# Patient Record
Sex: FEMALE | Race: WHITE | Employment: FULL TIME | URBAN - METROPOLITAN AREA
[De-identification: names, ages, dates, MRNs, and addresses within clinical notes are randomized per-mention and may not be internally consistent; named-entity substitution may affect disease eponyms.]

---

## 2017-06-03 ENCOUNTER — OFFICE VISIT (OUTPATIENT)
Dept: FAMILY MEDICINE CLINIC | Age: 47
End: 2017-06-03

## 2017-06-03 VITALS
HEIGHT: 64 IN | TEMPERATURE: 98.6 F | BODY MASS INDEX: 42.37 KG/M2 | WEIGHT: 248.2 LBS | DIASTOLIC BLOOD PRESSURE: 74 MMHG | SYSTOLIC BLOOD PRESSURE: 108 MMHG | OXYGEN SATURATION: 97 % | HEART RATE: 93 BPM | RESPIRATION RATE: 16 BRPM

## 2017-06-03 DIAGNOSIS — J40 BRONCHITIS: Primary | ICD-10-CM

## 2017-06-03 DIAGNOSIS — R05.9 COUGH: ICD-10-CM

## 2017-06-03 RX ORDER — AZITHROMYCIN 250 MG/1
TABLET, FILM COATED ORAL
Qty: 6 TAB | Refills: 0 | Status: SHIPPED | OUTPATIENT
Start: 2017-06-03 | End: 2017-06-08

## 2017-06-03 RX ORDER — PROMETHAZINE HYDROCHLORIDE AND CODEINE PHOSPHATE 6.25; 1 MG/5ML; MG/5ML
5 SOLUTION ORAL
Qty: 80 ML | Refills: 0 | Status: SHIPPED | OUTPATIENT
Start: 2017-06-03 | End: 2017-11-03

## 2017-06-03 RX ORDER — FLUTICASONE PROPIONATE 50 MCG
2 SPRAY, SUSPENSION (ML) NASAL DAILY
Qty: 1 BOTTLE | Refills: 0 | Status: SHIPPED | OUTPATIENT
Start: 2017-06-03

## 2017-06-03 NOTE — PROGRESS NOTES
Chief Complaint   Patient presents with    Cough     x 3 weeks non productive with some clear mucus    Shortness of Breath     c/o feeling pressure in chest that comes and goes     Denies taking any otc medication  Med list reviewed  \"REVIEWED RECORD IN PREPARATION FOR VISIT AND HAVE OBTAINED THE NECESSARY DOCUMENTATION\"

## 2017-06-03 NOTE — MR AVS SNAPSHOT
Visit Information Date & Time Provider Department Dept. Phone Encounter #  
 6/3/2017  9:40 AM Jeff Corcoran MD [address] [place] [address] [phone] [id number] Follow-up Instructions Return if symptoms worsen or fail to improve. Your Appointments 7/3/2017 11:00 AM  
COMPLETE PHYSICAL with Daysi Saeed MD  
[facility]) Appt Note: o/b mychart - CPE  
 [address] [id number]  
[phone]  
  
   
 [address] [address] Upcoming Health Maintenance Date Due Pneumococcal 19-64 Medium Risk (1 of 1 - PPSV23) 4/19/1991 INFLUENZA AGE 9 TO ADULT 8/1/2017 BREAST CANCER SCRN MAMMOGRAM 10/10/2018 PAP AKA CERVICAL CYTOLOGY 11/10/2018 DTaP/Tdap/Td series (2 - Td) 11/10/2025 Allergies as of 6/3/2017  Review Complete On: 11/10/2015 By: [address] [place] [address] [address], MD  
 No Known Allergies Current Immunizations  Reviewed on 11/10/2015 Name Date Tdap 11/10/2015 Not reviewed this visit You Were Diagnosed With   
  
 Codes Comments Bronchitis    -  Primary ICD-10-CM: M64 ICD-9-CM: 248 Cough     ICD-10-CM: R05 ICD-9-CM: 521. 2 Vitals BP Pulse Temp Resp Height(growth percentile) Weight(growth percentile) 108/74 (BP 1 Location: Left arm, BP Patient Position: Sitting) 93 98.6 °F (37 °C) (Oral) 16 5' 4\" (1.626 m) 248 lb 3.2 oz (112.6 kg) LMP SpO2 BMI OB Status Smoking Status 04/19/2017 97% 42.6 kg/m2 Having regular periods Never Smoker Vitals History BMI and BSA Data Body Mass Index Body Surface Area  
 42.6 kg/m 2 2.25 m 2 Preferred Pharmacy Pharmacy Name Phone [facility] [phone] Your Updated Medication List  
  
   
This list is accurate as of: 6/3/17 10:52 AM.  Always use your most recent med list.  
  
  
  
  
 azithromycin 250 mg tablet Commonly known as:  Samantha Main Take 2 tablets today, then take 1 tablet daily  
  
 budesonide-formoterol 80-4.5 mcg/actuation Hfaa inhaler Commonly known as:  SYMBICORT Take 2 Puffs by inhalation two (2) times a day. cetirizine 10 mg tablet Commonly known as:  ZYRTEC Take 1 Tab by mouth daily. fluticasone 50 mcg/actuation nasal spray Commonly known as:  Beardsley Mad 2 Sprays by Both Nostrils route daily. KRILL OIL PO Take  by mouth.  
  
 montelukast 10 mg tablet Commonly known as:  SINGULAIR Take 1 Tab by mouth every evening.  
  
 multivitamin with iron-mineral 0.8 mg Cmpk Take  by mouth. Multivitamin-Mineral Oil pack, Lemon Peel & Wild [employer] concentrate oil  
  
 promethazine-codeine 6.25-10 mg/5 mL syrup Commonly known as:  PHENERGAN with CODEINE Take 5 mL by mouth four (4) times daily as needed for Cough. Max Daily Amount: 20 mL. VITAMIN D3 PO Take  by mouth. Prescriptions Printed Refills  
 promethazine-codeine (PHENERGAN WITH CODEINE) 6.25-10 mg/5 mL syrup 0 Sig: Take 5 mL by mouth four (4) times daily as needed for Cough. Max Daily Amount: 20 mL. Class: Print Route: Oral  
  
Prescriptions Sent to Pharmacy Refills  
 azithromycin (ZITHROMAX) 250 mg tablet 0 Sig: Take 2 tablets today, then take 1 tablet daily Class: Normal  
 Pharmacy: [address], [address] [place] [address] [facility] [place]. Ph #: [phone]  
 fluticasone (FLONASE) 50 mcg/actuation nasal spray 0 Si Sprays by Both Nostrils route daily. Class: Normal  
 Pharmacy: [address], [address] [place] [address] [facility] [place]. Ph #: [phone] Route: Both Nostrils Follow-up Instructions Return if symptoms worsen or fail to improve. Introducing [facility]!    
 Dear Vinay Sharma: 
 Thank you for requesting a [email or web address] account. Our records indicate that you already have an active [email or web address] account. You can access your account anytime at [email or web address] Did you know that you can access your hospital and ER discharge instructions at any time in [email or web address]? You can also review all of your test results from your hospital stay or ER visit. Additional Information If you have questions, please visit the Frequently Asked Questions section of the [email or web address] website at [email or web address] Remember, [email or web address] is NOT to be used for urgent needs. For medical emergencies, dial 911. Now available from your iPhone and Android! Please provide this summary of care documentation to your next provider. Your primary care clinician is listed as SYLVIA DOS SANTOS. If you have any questions after today's visit, please call [phone].

## 2017-06-03 NOTE — PROGRESS NOTES
Subjective:     Chief Complaint   Patient presents with    Cough     x 3 weeks non productive with some clear mucus    Shortness of Breath     c/o feeling pressure in chest that comes and goes        She  is a 41y.o. year old female  who presents with a complain of non productive coughing spells for the past 3 weeks. Reports that the symptoms started with cold symptoms followed by this persistent non productive cough. She does not feel any sob but her chest hurts when she cough. Denies any fever. She has been taking some herbal supplement to treat her symptoms but not better. Pertinent items are noted in HPI. Objective:     Vitals:    06/03/17 1005   BP: 108/74   Pulse: 93   Resp: 16   Temp: 98.6 °F (37 °C)   TempSrc: Oral   SpO2: 97%   Weight: 248 lb 3.2 oz (112.6 kg)   Height: 5' 4\" (1.626 m)       Physical Examination: General appearance - alert, well appearing, and in no distress, oriented to person, place, and time and overweight  Mental status - alert, oriented to person, place, and time, normal mood, behavior, speech, dress, motor activity, and thought processes  Ears - bilateral TM's and external ear canals normal  Nose - normal and patent, no erythema, discharge or polyps  Mouth - mucous membranes moist, pharynx normal without lesions  Neck - supple, no significant adenopathy  Chest - clear to auscultation, no wheezes, rales or rhonchi, symmetric air entry. Bronchospasm noted with inspiration.    Heart - normal rate, regular rhythm, normal S1, S2, no murmurs, rubs, clicks or gallops    No Known Allergies   Social History     Social History    Marital status:      Spouse name: N/A    Number of children: 2    Years of education: N/A     Occupational History          Social History Main Topics    Smoking status: Never Smoker    Smokeless tobacco: Never Used    Alcohol use Yes      Comment: Rare, socially, cocktail or wine    Drug use: No    Sexual activity: Yes Partners: Male     Birth control/ protection: Condom     Other Topics Concern    Caffeine Concern No     1 mug of coffee a day; tea a few times a week    Weight Concern No     stable in close to the 230's range x years now    Special Diet Yes     trying to eat more organic foods, healthier food choices, multivitamin/mineral packs, fresh vegetables    Exercise No     Social History Narrative    [place] Descent; 1 son, 1 daughter      Family History   Problem Relation Age of Onset    High Cholesterol Mother      living    High Cholesterol Father      living   [facility] Breast Cancer Maternal Grandmother 61    Arthritis-osteo Maternal Grandmother     Diabetes Maternal Grandmother     Osteoporosis Maternal Grandmother     Cancer Maternal Grandmother 82     Liver    Diabetes Maternal Grandfather     Heart Disease Maternal Grandfather     High Cholesterol Maternal Grandfather     Stroke Maternal Grandfather 79    Diabetes Paternal Grandfather     Glaucoma Paternal Grandfather     Diabetes Paternal Grandmother     Allergic Rhinitis Son      skin testing, multiple allergies    Breast Cancer Maternal Aunt       Past Surgical History:   Procedure Laterality Date    BIOPSY OF BREAST, NEEDLE CORE  10/15/2015    Y[place]anda    ELECTROCARDIOGRAM REPORT      normal    HX ACL RECONSTRUCTION  1997    right    HX BREAST BIOPSY  2002    benign, left breast    HX  SECTION  2003, 2007    HX CYST REMOVAL  1993    fallopian tube    HX LIPOMA RESECTION  2005    left arm    HX TONSIL AND ADENOIDECTOMY  11yo    HX TYMPANOSTOMY      multiple in her childhood up until T&A age 10 yo      Past Medical History:   Diagnosis Date    AR (allergic rhinitis) 2011    Cough variant asthma 4/10/2014    2014    GDM (gestational diabetes mellitus)      GERD (gastroesophageal reflux disease) 2011    Hx of mammogram 10/10/2016    [facility] [place] Interval follow up   [address] [place] [address] Hypercholesterolemia      LBP (low back pain) 1/31/2011    Obesity     Plantar fasciitis, bilateral 1/3/2014      Current Outpatient Prescriptions   Medication Sig Dispense Refill    promethazine-codeine (PHENERGAN WITH CODEINE) 6.25-10 mg/5 mL syrup Take 5 mL by mouth four (4) times daily as needed for Cough. Max Daily Amount: 20 mL. 80 mL 0    azithromycin (ZITHROMAX) 250 mg tablet Take 2 tablets today, then take 1 tablet daily 6 Tab 0    fluticasone (FLONASE) 50 mcg/actuation nasal spray 2 Sprays by Both Nostrils route daily. 1 Bottle 0    CHOLECALCIFEROL, VITAMIN D3, (VITAMIN D3 PO) Take  by mouth.  KRILL OIL PO Take  by mouth.  multivitamin with iron-mineral 0.8 mg cmpk Take  by mouth. Multivitamin-Mineral Oil pack, Lemon Peel & Wild [employer] concentrate oil      montelukast (SINGULAIR) 10 mg tablet Take 1 Tab by mouth every evening. 30 Tab 2    cetirizine (ZYRTEC) 10 mg tablet Take 1 Tab by mouth daily.  budesonide-formoterol (SYMBICORT) 80-4.5 mcg/actuation HFAA inhaler Take 2 Puffs by inhalation two (2) times a day. 1 Inhaler 2        Assessment/ Plan:   Chucho Samson was seen today for cough and shortness of breath. Diagnoses and all orders for this visit:    Bronchitis  -     promethazine-codeine (PHENERGAN WITH CODEINE) 6.25-10 mg/5 mL syrup; Take 5 mL by mouth four (4) times daily as needed for Cough. Max Daily Amount: 20 mL.  -     azithromycin (ZITHROMAX) 250 mg tablet; Take 2 tablets today, then take 1 tablet daily  -     fluticasone (FLONASE) 50 mcg/actuation nasal spray; 2 Sprays by Both Nostrils route daily. Cough  -     promethazine-codeine (PHENERGAN WITH CODEINE) 6.25-10 mg/5 mL syrup; Take 5 mL by mouth four (4) times daily as needed for Cough. Max Daily Amount: 20 mL.  -     azithromycin (ZITHROMAX) 250 mg tablet; Take 2 tablets today, then take 1 tablet daily  -     fluticasone (FLONASE) 50 mcg/actuation nasal spray; 2 Sprays by Both Nostrils route daily. Medication risks/benefits/costs/interactions/alternatives discussed with patient. Advised patient to call back or return to office if symptoms worsen/change/persist. If patient cannot reach us or should anything more severe/urgent arise he/she should proceed directly to the nearest emergency department. Discussed expected course/resolution/complications of diagnosis in detail with patient. Patient given a written after visit summary which includes her diagnoses, current medications and vitals. Patient expressed understanding with the diagnosis and plan. Follow-up Disposition:  Return if symptoms worsen or fail to improve.

## 2017-06-16 ENCOUNTER — TELEPHONE (OUTPATIENT)
Dept: FAMILY MEDICINE CLINIC | Age: 47
End: 2017-06-16

## 2017-06-16 NOTE — TELEPHONE ENCOUNTER
Jeronimo Olsen  [phone]    Patient states that she was seen on June 3rd by Dr. Blessing Alberts. She has not gotten any better and has scheduled an appointment with a pulmonologist, Dr. Lester Galindo. He is requesting office notes from her last few visits. Please fax the notes to: [phone].

## 2017-11-03 ENCOUNTER — OFFICE VISIT (OUTPATIENT)
Dept: FAMILY MEDICINE CLINIC | Age: 47
End: 2017-11-03

## 2017-11-03 VITALS
DIASTOLIC BLOOD PRESSURE: 82 MMHG | OXYGEN SATURATION: 98 % | TEMPERATURE: 98.3 F | SYSTOLIC BLOOD PRESSURE: 120 MMHG | HEART RATE: 79 BPM | WEIGHT: 248 LBS | RESPIRATION RATE: 18 BRPM | HEIGHT: 64 IN | BODY MASS INDEX: 42.34 KG/M2

## 2017-11-03 DIAGNOSIS — R05.3 CHRONIC COUGH: ICD-10-CM

## 2017-11-03 DIAGNOSIS — E78.00 HYPERCHOLESTEROLEMIA: ICD-10-CM

## 2017-11-03 DIAGNOSIS — E55.9 VITAMIN D DEFICIENCY: ICD-10-CM

## 2017-11-03 DIAGNOSIS — Z83.3 FAMILY HISTORY OF DIABETES MELLITUS TYPE II: ICD-10-CM

## 2017-11-03 DIAGNOSIS — Z82.49 FAMILY HISTORY OF HEART DISEASE: ICD-10-CM

## 2017-11-03 DIAGNOSIS — Z00.00 ROUTINE GENERAL MEDICAL EXAMINATION AT A HEALTH CARE FACILITY: Primary | ICD-10-CM

## 2017-11-03 RX ORDER — CHOLECALCIFEROL TAB 125 MCG (5000 UNIT) 125 MCG
TAB ORAL DAILY
COMMUNITY

## 2017-11-03 NOTE — PROGRESS NOTES
Chief Complaint   Patient presents with    Complete Physical     fasting               1. Have you been to the ER, urgent care clinic since your last visit? Hospitalized since your last visit? No    2. Have you seen or consulted any other health care providers outside of the [facility] since your last visit? Include any pap smears or colon screening.    Dr Joey Villa and Dr Yuval Barnes  -chiropractor

## 2017-11-04 ENCOUNTER — TELEPHONE (OUTPATIENT)
Dept: FAMILY MEDICINE CLINIC | Age: 47
End: 2017-11-04

## 2017-11-04 DIAGNOSIS — R74.8 ELEVATED LIVER ENZYMES: Primary | ICD-10-CM

## 2017-11-04 LAB
25(OH)D3+25(OH)D2 SERPL-MCNC: 35 NG/ML (ref 30–100)
ALBUMIN SERPL-MCNC: 4.8 G/DL (ref 3.5–5.5)
ALBUMIN/GLOB SERPL: 1.7 {RATIO} (ref 1.2–2.2)
ALP SERPL-CCNC: 57 IU/L (ref 39–117)
ALT SERPL-CCNC: 71 IU/L (ref 0–32)
AST SERPL-CCNC: 57 IU/L (ref 0–40)
BILIRUB SERPL-MCNC: 0.5 MG/DL (ref 0–1.2)
BUN SERPL-MCNC: 12 MG/DL (ref 6–24)
BUN/CREAT SERPL: 17 (ref 9–23)
CALCIUM SERPL-MCNC: 10.2 MG/DL (ref 8.7–10.2)
CHLORIDE SERPL-SCNC: 97 MMOL/L (ref 96–106)
CHOLEST SERPL-MCNC: 251 MG/DL (ref 100–199)
CO2 SERPL-SCNC: 23 MMOL/L (ref 18–29)
CREAT SERPL-MCNC: 0.71 MG/DL (ref 0.57–1)
ERYTHROCYTE [DISTWIDTH] IN BLOOD BY AUTOMATED COUNT: 13.6 % (ref 12.3–15.4)
EST. AVERAGE GLUCOSE BLD GHB EST-MCNC: 126 MG/DL
GFR SERPLBLD CREATININE-BSD FMLA CKD-EPI: 103 ML/MIN/1.73
GFR SERPLBLD CREATININE-BSD FMLA CKD-EPI: 119 ML/MIN/1.73
GLOBULIN SER CALC-MCNC: 2.9 G/DL (ref 1.5–4.5)
GLUCOSE SERPL-MCNC: 106 MG/DL (ref 65–99)
HBA1C MFR BLD: 6 % (ref 4.8–5.6)
HCT VFR BLD AUTO: 42.3 % (ref 34–46.6)
HDLC SERPL-MCNC: 50 MG/DL
HGB BLD-MCNC: 14.4 G/DL (ref 11.1–15.9)
INTERPRETATION, 910389: NORMAL
LDLC SERPL CALC-MCNC: 156 MG/DL (ref 0–99)
MCH RBC QN AUTO: 29.2 PG (ref 26.6–33)
MCHC RBC AUTO-ENTMCNC: 34 G/DL (ref 31.5–35.7)
MCV RBC AUTO: 86 FL (ref 79–97)
PLATELET # BLD AUTO: 233 X10E3/UL (ref 150–379)
POTASSIUM SERPL-SCNC: 4.1 MMOL/L (ref 3.5–5.2)
PROT SERPL-MCNC: 7.7 G/DL (ref 6–8.5)
RBC # BLD AUTO: 4.93 X10E6/UL (ref 3.77–5.28)
SODIUM SERPL-SCNC: 139 MMOL/L (ref 134–144)
TRIGL SERPL-MCNC: 225 MG/DL (ref 0–149)
TSH SERPL DL<=0.005 MIU/L-ACNC: 2.85 UIU/ML (ref 0.45–4.5)
VLDLC SERPL CALC-MCNC: 45 MG/DL (ref 5–40)
WBC # BLD AUTO: 7.1 X10E3/UL (ref 3.4–10.8)

## 2017-11-04 NOTE — PROGRESS NOTES
HISTORY OF PRESENT ILLNESS   HPI  Annual CPE fasting. Admits she has not been very disciplined w/ diet/exercise and has regained the wt she lost on previous diet/exercise regimen (detailed in Soc Hx below). She has been seeing Pulmonary and Allergy for chronic cough. She reports that CXR and Spirometry done this year were all reportedly normal.  She states she does not have asthma based on the pre/post bronchodilator test and that one time the inhalers didn't help her symptoms at all and another time the inhaler helped a little. Pulm referred her to Allergy and pt tested + to multiple environmental triggers. She was prescribed Flonase initially bid then tapered to every day. She has noticed no difference and will follow up w/ Dr Krystian Ram again for next plan of action. She has tried otc antihistamines, rx Singulair, maintenance inhalers and report they dont do anything. It has also been suggested to her to try otc meds for \"silent gerd\". She instead chose to do some natural herb/digestive enzymes instead which didn't make the cough go away either. She has very occ indigestion triggered by certain foods but that hasnt bothered her lately. REVIEW OF SYMPTOMS     Review of Systems   Constitutional: Negative. Eyes: Negative. Respiratory: Negative for sputum production, shortness of breath and wheezing. Cardiovascular: Negative. Gastrointestinal: Negative. Genitourinary: Negative.     Neurological: Negative.            PROBLEM LIST/MEDICAL HISTORY      Problem List  Date Reviewed: 11/4/2017          Codes Class Noted    Chronic cough ICD-10-CM: R05  ICD-9-CM: 786.2  11/3/2017    Overview Addendum 11/3/2017 12:00 PM by [address] [place] [address], MD     Evaluation 2014; Dr Karen Hidalgo & Dr Sarah Gonzalez (same group), Pulmonary evaluation summer 2017: CXR and PFT's reportedly normal: dx heartburn and allergies; referred to Allergist (Dr Krystian Ram)              Vitamin D deficiency ICD-10-CM: E55.9  ICD-9-CM: 268.9 11/3/2017    Overview Signed 11/3/2017 12:18 PM by West Grayson MD                  Plantar fasciitis, bilateral ICD-10-CM: M72.2  ICD-9-CM: 728.71  1/3/2014        Obesity ICD-10-CM: E66.9  ICD-9-CM: 278.00  Unknown        Hypercholesterolemia ICD-10-CM: E78.00  ICD-9-CM: 272.0  2011    Overview Signed 2011 10:20 AM by West Grayson MD                  History of gestational diabetes mellitus, diet controlled ICD-10-CM: A89.83  ICD-9-CM: V12.21  2011    Overview Addendum 2011  5:16 PM by West Grayson MD     2003, 2007             AR (allergic rhinitis) ICD-10-CM: J30.9  ICD-9-CM: 477.9  2011    Overview Signed 11/3/2017 11:48 AM by West Grayson MD     Blood testing in the : mold: Re-evaluation Dr Vahe Hudson at [place] [address] 10-11-17: skin testing: + trees, oaks, ragweed, grass, weeds, dust mites, mold             GERD (gastroesophageal reflux disease) ICD-10-CM: K21.9  ICD-9-CM: 530.81  2011        LBP (low back pain) ICD-10-CM: M54.5  ICD-9-CM: 724.2  2011                  PAST SURGICAL HISTORY       Past Surgical History:   Procedure Laterality Date    BIOPSY OF BREAST, NEEDLE CORE  10/15/2015    [place]    ELECTROCARDIOGRAM REPORT      normal    HX ACL RECONSTRUCTION  1997    right    HX BREAST BIOPSY  2002    benign, left breast    HX  SECTION  2003, 2007    HX CYST REMOVAL  1993    fallopian tube    HX LIPOMA RESECTION  2005    left arm    HX TONSIL AND ADENOIDECTOMY  11yo    HX TYMPANOSTOMY      multiple in her childhood up until T&A age 12 yo         MEDICATIONS      Current Outpatient Prescriptions   Medication Sig    cholecalciferol, VITAMIN D3, (VITAMIN D3) 5,000 unit tab tablet Take  by mouth daily.  fluticasone (FLONASE) 50 mcg/actuation nasal spray 2 Sprays by Both Nostrils route daily. (Patient taking differently: 2 Sprays by Both Nostrils route daily.  Since 10-11- per Allergist)  KRILL OIL PO Take  by mouth daily.  multivitamin with iron-mineral 0.8 mg cmpk Take  by mouth. Multivitamin-Mineral Oil pack, Lemon Peel & Wild [employer] concentrate oil     No current facility-administered medications for this visit.            ALLERGIES   No Known Allergies       SOCIAL HISTORY       Social History     Social History    Marital status:      Spouse name: N/A    Number of children: 2    Years of education: N/A     Occupational History          Social History Main Topics    Smoking status: Never Smoker    Smokeless tobacco: Never Used    Alcohol use Yes      Comment: Rare, socially, cocktail or wine    Drug use: No    Sexual activity: Yes     Partners: Male     Birth control/ protection: Condom     Other Topics Concern    Caffeine Concern No     1 mug of coffee a day; tea a few times a week    Special Diet No     did \"21 day fix diet\" 2-2016 x  6months (no processed foods, low sugar, low fat, low carb, high protein diet) plus exercise 3 x a week and lost 20 lbs; but has regained it all w/ not doing any of this much any more; just trying to keep carbs low right now    Exercise No     Social History Narrative    [place] Descent; 1 son, 1 daughter        IMMUNIZATIONS  Immunization History   Administered Date(s) Administered    Tdap 11/10/2015         FAMILY HISTORY     Family History   Problem Relation Age of Onset    High Cholesterol Mother      on medication    High Cholesterol Father      living    Heart Disease Father 71     [address]     Breast Cancer Maternal Grandmother 61    Arthritis-osteo Maternal Grandmother     Diabetes Maternal Grandmother     Osteoporosis Maternal Grandmother     Cancer Maternal Grandmother 82     Liver    Diabetes Maternal Grandfather     Heart Disease Maternal Grandfather     High Cholesterol Maternal Grandfather     Stroke Maternal Grandfather 79    Diabetes Paternal Grandfather     Glaucoma Paternal Grandfather  Diabetes Paternal Grandmother     Obesity Brother     Allergic Rhinitis Son      skin testing, multiple allergies    Breast Cancer Maternal Aunt          VITALS     Visit Vitals    /82 (BP 1 Location: Left arm, BP Patient Position: Sitting)    Pulse 79    Temp 98.3 °F (36.8 °C) (Oral)    Resp 18    Ht 5' 4\" (1.626 m)    Wt 248 lb (112.5 kg)    LMP 09/17/2017    SpO2 98%    BMI 42.57 kg/m2          PHYSICAL EXAMINATION     Physical Exam   Constitutional: She is oriented to person, place, and time. No distress. Obese     HENT:   Right Ear: Tympanic membrane normal.   Left Ear: Tympanic membrane normal.   Nose: Nose normal.   Mouth/Throat: Oropharynx is clear and moist. No oropharyngeal exudate. Eyes: Conjunctivae and EOM are normal. Pupils are equal, round, and reactive to light. Neck: Neck supple. No thyromegaly present. Cardiovascular: Normal rate, regular rhythm and normal heart sounds. No murmur heard. Pulmonary/Chest: Effort normal and breath sounds normal. No respiratory distress. She has no wheezes. She has no rales. Abdominal: Soft. Bowel sounds are normal. There is no tenderness. Musculoskeletal: Normal range of motion. She exhibits no edema or tenderness. Lymphadenopathy:     She has no cervical adenopathy. Neurological: She is alert and oriented to person, place, and time. Skin: Skin is warm. Psychiatric: Affect normal.   Vitals reviewed.              ASSESSMENT & PLAN       ICD-10-CM ICD-9-CM    1. Routine general medical examination at a health care facility Z00.00 V70.0 CBC W/O DIFF      LIPID PANEL      METABOLIC PANEL, COMPREHENSIVE      HEMOGLOBIN A1C WITH EAG      TSH 3RD GENERATION      VITAMIN D, 25 HYDROXY      HCG QL SERUM   2. Hypercholesterolemia E78.00 272.0 LIPID PANEL   3. Vitamin D deficiency E55.9 268.9 VITAMIN D, 25 HYDROXY   4. Family history of diabetes mellitus type II Z83.3 V18.0 HEMOGLOBIN A1C WITH EAG   5.  Family history of heart disease Z82.49 V17.49 CT HEART W/O CONT WITH CALCIUM   6. Chronic cough R05 786.2      Fasting labs today  Reviewed diet, nutrition, exercise and weight control  Cardiovascular risk and specific lipid/LDL/BS goals reviewed  Her father had heart stents placed this year. She has strong family h/o obesity, diabetes, heart disease and her father's cardiologist recommended that his children have Coronary CT scanning. This was ordered today. As for the chronic cough she has been seeing Pulm and Allergy this year. She reports that her CXR and lung function tests were normal. She tested positive to several environmental allergies and is currently on Flonase per Dr Kennedi Bowser but w/o relief. I encouraged her to follow up w/ him as advised. It was suggested that she perhaps consider immunotherapy since she has failed antihistamines, Singulair and inhalers. She will consider that and discuss further again at her f/u w/ [facility]. She inquired about next step after that if that fails and I suggested at that point would refer to GI for upper endoscopy. It has been recommended to her before to try OTC h2 blockers or ppi's but she instead opted to try natural/digestive enzymes first which did not help. She would prefer more substantial evidence that gerd could be a causative factor in her case before she would decide to commit to taking a medication for that ongoing.    Further follow up & other recommendations pending review of labs as well  Sees gyn annually for well woman visits/gyn exams/mammogram screens all of which she states was normal a few weeks ago.

## 2017-11-04 NOTE — TELEPHONE ENCOUNTER
Thyroid normal  Vit D improved and wnl  Fasting BS and HgBA1c both prediabetes but have gone up some from last check. A1c is highest it has been the last several checks. Review lipids, all high and none at goal except HDL good at 50. Liver enzymes elevated, likely related to her wt, lipids and fatty liver. I recommend additional labs and Abd US to [place]. After r/w pt, route back to me to place order for US. Labs already pended. Does not need appt and no fasting. She really needs to work hard on diet/exercise and wt loss which we talked about a lot at her last appt. She can either do the 21dayfix diet meal plan that worked really well for her before in addition to moderate intensity exercise at least 150 minutes per week. We can refer her to dietician if she would like as well. Fasting follow up 6months, set now. Further recs also after additional liver tests and US. I am waiting on the [facility] so she can get the heart CT scan screen.

## 2017-11-04 NOTE — PROGRESS NOTES
Thyroid normal  Vit D improved and wnl  Fasting BS and HgBA1c both prediabetes but have gone up some from last check. A1c is highest it has been the last several checks. Review lipids, all high and none at goal except HDL good at 50. Liver enzymes elevated, likely related to her wt, lipids and fatty liver. I recommend additional labs and Abd US to [place]. After r/w pt, route back to me to place order for US. Labs already pended. Does not need appt and no fasting. She really needs to work hard on diet/exercise and wt loss which we talked about a lot at her last appt. She can either do the 21dayfix diet meal plan that worked really well for her before in addition to moderate intensity exercise at least 150 minutes per week. We can refer her to dietician if she would like as well. Fasting follow up 6months, set now. Further recs also after additional liver tests and US. I am waiting on the [facility] so she can get the heart CT scan screen.

## 2017-11-04 NOTE — PATIENT INSTRUCTIONS
Chronic Cough: Care Instructions  Your Care Instructions    A cough is your body's response to something that bothers your throat or airways. Many things can cause a cough. You might cough because of a cold or the flu, bronchitis, or asthma. Smoking, postnasal drip, allergies, and stomach acid that backs up into your throat also can cause a cough. A cough can be short-term (acute) or long-term (chronic). A chronic cough lasts more than 3 weeks. A chronic cough is often caused by a long-term problem, such as asthma. Another cause might be a medicine, such as an ACE inhibitor. A cough is a symptom, not a disease. To treat a chronic cough, you may need to treat the problem that causes it. You can take a few steps at home to cough less and feel better. Some people cough or clear their throat out of habit for no clear reason. Follow-up care is a key part of your treatment and safety. Be sure to make and go to all appointments, and call your doctor if you are having problems. It's also a good idea to know your test results and keep a list of the medicines you take. How can you care for yourself at home? · Drink plenty of water and other fluids. This may help soothe a dry or sore throat. Honey or lemon juice in hot water or tea may ease a dry cough. · Prop up your head on pillows to help you breathe and ease a cough. · Do not smoke or allow others to smoke around you. Smoke can make a cough worse. If you need help quitting, talk to your doctor about stop-smoking programs and medicines. These can increase your chances of quitting for good. · Avoid exposure to smoke, dust, or other pollutants, or wear a face mask. Check with your doctor or pharmacist to find out which type of face mask will give you the most benefit. · Take cough medicine as directed by your doctor. · Try cough drops to soothe a dry or sore throat. Cough drops don't stop a cough.  Medicine-flavored cough drops are no better than candy-flavored drops or hard candy. Throat clearing  When you have a chronic cough or a disease that may cause this type of cough, you may often feel like you want to clear your throat. This helps bring up mucus. But throat clearing does not always have a cause. Throat clearing can become a habit. The more you do it, the more you feel like you need to do it. But frequent throat clearing can be hard on your vocal cords. It's like slamming them together. To help lessen throat clearing, you can try:  · Taking small sips of water. · Not clearing your throat when you feel you need to. · Swallowing hard when you want to clear your throat. You may want to ask your doctor if a medicine that thins mucus would help. When should you call for help? Call 911 anytime you think you may need emergency care. For example, call if:  ? · You have severe trouble breathing. ?Call your doctor now or seek immediate medical care if:  ? · You cough up blood. ? · You have new or worse trouble breathing. ? · You have a new or higher fever. ? Watch closely for changes in your health, and be sure to contact your doctor if:  ? · You cough more deeply or more often, especially if you notice more mucus or a change in the color of your mucus. ? · You do not get better as expected. Where can you learn more? Go to http://mahsa-alexus.info/. Enter C006 in the search box to learn more about \"Chronic Cough: Care Instructions. \"  Current as of: May 12, 2017  Content Version: 11.4  © 0046-8370 [employer]. Care instructions adapted under license by [employer] (which disclaims liability or warranty for this information). If you have questions about a medical condition or this instruction, always ask your healthcare professional. [address] any warranty or liability for your use of this information.

## 2017-11-07 NOTE — TELEPHONE ENCOUNTER
PI of results and she was transferred to set up appt. She won't be able to come for labs this week but will try for Monday.

## 2017-11-12 NOTE — TELEPHONE ENCOUNTER
Doesn't look like th pregnancy was run. Just tell pt be sure she is not pregnant prior to getting the CT done, she is not on any birth control. She can do a home preg test if needed.

## 2017-11-20 ENCOUNTER — HOSPITAL ENCOUNTER (OUTPATIENT)
Dept: ULTRASOUND IMAGING | Age: 47
Discharge: HOME | End: 2017-11-20
Payer: COMMERCIAL

## 2017-11-20 DIAGNOSIS — R74.8 ELEVATED LIVER ENZYMES: ICD-10-CM

## 2017-11-20 PROCEDURE — 76700 US EXAM ABDOM COMPLETE: CPT

## 2017-11-22 LAB — SPECIMEN STATUS REPORT, ROLRST: NORMAL

## 2017-11-30 LAB
B-HCG SERPL QL: NEGATIVE MIU/ML
SPECIMEN STATUS REPORT, ROLRST: NORMAL

## 2017-12-02 ENCOUNTER — TELEPHONE (OUTPATIENT)
Dept: FAMILY MEDICINE CLINIC | Age: 47
End: 2017-12-02

## 2017-12-02 DIAGNOSIS — K76.6 PORTAL HYPERTENSION (HCC): ICD-10-CM

## 2017-12-02 DIAGNOSIS — K76.0 HEPATIC STEATOSIS: Primary | ICD-10-CM

## 2017-12-02 DIAGNOSIS — R16.1 SPLENOMEGALY: ICD-10-CM

## 2017-12-03 NOTE — TELEPHONE ENCOUNTER
Pregnancy test negative  Abd US shows:  Severe diffuse hepatic steatosis. Top normal diameter of the portal vein with mildly enlarged spleen. Portal hypertension cannot be excluded. I recommend evaluation per Hepatology. I have placed order to print for [facility]. She is already scheduled to see me for follow up in May. I will follow w/ Hepatology in [address] [place] [address].

## 2017-12-05 ENCOUNTER — TELEPHONE (OUTPATIENT)
Dept: FAMILY MEDICINE CLINIC | Age: 47
End: 2017-12-05

## 2017-12-05 NOTE — TELEPHONE ENCOUNTER
ANNALISE for return call. Left another     PI of results she states understanding. She wanted to know about the heart scan and I gave her the # to call to get it scheduled. She will come back to get labs.

## 2017-12-05 NOTE — TELEPHONE ENCOUNTER
----- Message from Mandeep Brown sent at 12/5/2017 12:48 PM EST -----  Regarding: Dr. Lor Cook  Pt missed a call from the practice and would like for another attempt to be made. Her best contact number is [phone].

## 2018-02-06 ENCOUNTER — TELEPHONE (OUTPATIENT)
Dept: HEMATOLOGY | Age: 48
End: 2018-02-06

## 2018-02-06 NOTE — TELEPHONE ENCOUNTER
Patient plans to arrive for 2/8/18 appointment. Reminder to bring insurance card and all medications. Directions to office given to patient.

## 2018-02-09 ENCOUNTER — OFFICE VISIT (OUTPATIENT)
Dept: HEMATOLOGY | Age: 48
End: 2018-02-09

## 2018-02-09 VITALS
BODY MASS INDEX: 42.57 KG/M2 | TEMPERATURE: 98.4 F | DIASTOLIC BLOOD PRESSURE: 76 MMHG | SYSTOLIC BLOOD PRESSURE: 118 MMHG | WEIGHT: 248 LBS | OXYGEN SATURATION: 97 % | HEART RATE: 88 BPM

## 2018-02-09 DIAGNOSIS — R74.8 ELEVATED LIVER ENZYMES: Primary | ICD-10-CM

## 2018-02-09 PROBLEM — Z98.890 H/O ARTHROSCOPIC KNEE SURGERY: Status: ACTIVE | Noted: 2018-02-09

## 2018-02-09 RX ORDER — TRIAMCINOLONE ACETONIDE 1 MG/G
CREAM TOPICAL
COMMUNITY
Start: 2017-12-29

## 2018-02-09 NOTE — PROGRESS NOTES
1. Have you been to the ER, urgent care clinic since your last visit? Hospitalized since your last visit? No    2. Have you seen or consulted any other health care providers outside of the [address] [place] [address] since your last visit? Include any pap smears or colon screening.  No   Chief Complaint   Patient presents with    Follow-up     Visit Vitals    /76 (BP 1 Location: Left arm, BP Patient Position: Sitting)    Pulse 88    Temp 98.4 °F (36.9 °C) (Tympanic)    Wt 248 lb (112.5 kg)    SpO2 97%    BMI 42.57 kg/m2     PHQ over the last two weeks 2/9/2018   Little interest or pleasure in doing things Not at all   Feeling down, depressed or hopeless Not at all   Total Score PHQ 2 0     Learning Assessment 2/9/2018   PRIMARY LEARNER Patient   PRIMARY LANGUAGE ENGLISH   LEARNER PREFERENCE PRIMARY LISTENING   ANSWERED BY patient   RELATIONSHIP SELF

## 2018-02-09 NOTE — PROGRESS NOTES
70 Inocencia Conte MD, [address], [facility], [place]       Mauro Ty, NP Magda Kocher, PA-C Sherryn Carina, KEMAR-BC   Sunil Stapleton, HATTIE [facility]    at [address]    [address] [place] [address], [address] Richie Jiang  22. [phone]    FAX: [address] [place] [address], [address] [place] [address], [address]    [phone]    FAX: [phone]         Patient Care Team:  Kingsley Dewitt MD as PCP - General      Problem List  Date Reviewed: 2/9/2018          Codes Class Noted    H/O arthroscopic knee surgery ICD-10-CM: Z98.890  ICD-9-CM: V45.89  2/9/2018        Chronic cough ICD-10-CM: R05  ICD-9-CM: 786.2  11/3/2017    Overview Addendum 11/3/2017 12:00 PM by Kingsley Dewitt MD     Evaluation 2014; Dr Adeel Meneses & Dr Gary Bourgeois (same group), Pulmonary evaluation summer 2017: CXR and PFT's reportedly normal: dx heartburn and allergies; referred to Allergist (Dr Veto Curtis)              Vitamin D deficiency ICD-10-CM: E55.9  ICD-9-CM: 268.9  11/3/2017    Overview Signed 11/3/2017 12:18 PM by Kingsley Dewitt MD     2014             Plantar fasciitis, bilateral ICD-10-CM: M72.2  ICD-9-CM: 728.71  1/3/2014        Obesity ICD-10-CM: E66.9  ICD-9-CM: 278.00  Unknown        Hypercholesterolemia ICD-10-CM: E78.00  ICD-9-CM: 272.0  2/1/2011    Overview Signed 2/1/2011 10:20 AM by Kingsley Dewitt MD     2004             History of gestational diabetes mellitus, diet controlled ICD-10-CM: V97.40  ICD-9-CM: V12.21  2/1/2011    Overview Addendum 2/1/2011  5:16 PM by Kingsley Dewitt MD     6/2003, 6/2007             AR (allergic rhinitis) ICD-10-CM: J30.9  ICD-9-CM: 477.9  1/31/2011    Overview Signed 11/3/2017 11:48 AM by Leah Amaya MD Zain     Blood testing in the 1990's: mold: Re-evaluation Dr Sherita Schulz at [place] [address] 10-11-17: skin testing: + trees, oaks, ragweed, grass, weeds, dust mites, mold             GERD (gastroesophageal reflux disease) ICD-10-CM: K21.9  ICD-9-CM: 530.81  1/31/2011        LBP (low back pain) ICD-10-CM: M54.5  ICD-9-CM: 724.2  1/31/2011                The physicians listed above have asked me to see Gretchen Staley in consultation regarding elevated liver enzymes and its management. All medical records sent by the referring physicians were reviewed including imaging studies     The patient is a 41 y.o.  female who was first noted to have abnormalities in liver transaminases in 2014. Serologic evaluation for markers of chronic liver disease were either not performed or available to me. Ultrasound of the liver was performed in 11/2017. The results of the imaging suggested fatty liver disease. An assessment of liver fibrosis with biopsy or elastography has not been performed. The most recent laboratory studies indicate that the liver transaminases are elevated, ALP is normal, tests of hepatic synthetic and metabolic function are normal, and the platelet count is normal.    The patient has no symptoms which could be attributed to the liver disorder. The patient completes all daily activities without any functional limitations. The patient has not experienced fatigue, pain in the right side over the liver,       ALLERGIES  No Known Allergies    MEDICATIONS  Current Outpatient Prescriptions   Medication Sig    triamcinolone acetonide (KENALOG) 0.1 % topical cream     cholecalciferol, VITAMIN D3, (VITAMIN D3) 5,000 unit tab tablet Take  by mouth daily.  fluticasone (FLONASE) 50 mcg/actuation nasal spray 2 Sprays by Both Nostrils route daily. (Patient taking differently: 2 Sprays by Both Nostrils route daily.  Since 10-11-17 per Allergist)    KRILL OIL PO Take  by mouth daily.  multivitamin with iron-mineral 0.8 mg cmpk Take  by mouth. Multivitamin-Mineral Oil pack, Lemon Peel & Wild [employer] concentrate oil     No current facility-administered medications for this visit. SYSTEM REVIEW NOT RELATED TO LIVER DISEASE OR REVIEWED ABOVE:  Constitution systems: Negative for fever, chills, weight gain, weight loss. Eyes: Negative for visual changes. ENT: Negative for sore throat, painful swallowing. Respiratory: Negative for cough, hemoptysis, SOB. Cardiology: Negative for chest pain, palpitations. GI:  Negative for constipation or diarrhea. : Negative for urinary frequency, dysuria, hematuria, nocturia. Skin: Negative for rash. Hematology: Negative for easy bruising, blood clots. Musculo-skelatal: Negative for back pain, muscle pain, weakness. Neurologic: Negative for headaches, dizziness, vertigo, memory problems not related to HE. Psychology: Negative for anxiety, depression. FAMILY HISTORY:  The father has the following chronic diseases: heart disease. The mother is alive and healthy. There is no family history of liver disease. SOCIAL HISTORY:  The patient is . The patient has 2 children,   The patient has never used tobacco products. The patient has never consumed significant amounts of alcohol. The patient currently works full time web design. PHYSICAL EXAMINATION:  Visit Vitals    /76 (BP 1 Location: Left arm, BP Patient Position: Sitting)    Pulse 88    Temp 98.4 °F (36.9 °C) (Tympanic)    Wt 248 lb (112.5 kg)    SpO2 97%    BMI 42.57 kg/m2     General: No acute distress. Eyes: Sclera anicteric. ENT: No oral lesions. Thyroid normal.  Nodes: No adenopathy. Skin: No spider angiomata. No jaundice. No palmar erythema. Respiratory: Lungs clear to auscultation. Cardiovascular: Regular heart rate. No murmurs. No JVD. Abdomen: Soft non-tender.   Liver size normal to percussion/palpation. Spleen not palpable. No obvious ascites. Extremities: No edema. No muscle wasting. No gross arthritic changes. Neurologic: Alert and oriented. Cranial nerves grossly intact. No asterixis. LABORATORY STUDIES:  [place] [address] Units 2/9/2018 11/3/2017   WBC 3.4 - 10.8 x10E3/uL 7.7 7.1   ANC 1.4 - 7.0 x10E3/uL 4.3    HGB 11.1 - 15.9 g/dL 14.2 14.4    - 379 x10E3/uL 257 233   AST 0 - 40 IU/L 58 (H) 57 (H)   ALT 0 - 32 IU/L 70 (H) 71 (H)   Alk Phos 39 - 117 IU/L 60 57   Bili, Total 0.0 - 1.2 mg/dL 0.3 0.5   Bili, Direct 0.00 - 0.40 mg/dL 0.13    Albumin 3.5 - 5.5 g/dL 4.9 4.8   BUN 6 - 24 mg/dL 15 12   Creat 0.57 - 1.00 mg/dL 0.67 0.71   Na 134 - 144 mmol/L 143 139   K 3.5 - 5.2 mmol/L 4.4 4.1   Cl 96 - 106 mmol/L 102 97   CO2 18 - 29 mmol/L 25 23   Glucose 65 - 99 mg/dL 120 (H) 106 (H)     SEROLOGIES:  Serologies Latest Ref Rng & Units 2/9/2018   Hep A Ab, Total Negative Negative   Hep B Surface Ag Negative Negative   Hep B Core Ab, Total Negative Negative   Hep B Surface AB QL  Non Reactive   Hep C Ab 0.0 - 0.9 s/co ratio <0.1   Ferritin 15 - 150 ng/mL 135   Iron % Saturation 15 - 55 % 16   Ceruloplasmin 19.0 - 39.0 mg/dL 31.7   Alpha-1 antitrypsin level 90 - 200 mg/dL 139     LIVER HISTOLOGY:  Not available or performed    ENDOSCOPIC PROCEDURES:  Not available or performed    RADIOLOGY:  11/2017. Ultrasound of liver. Echogenic consistent with fatty liver. No liver mass lesions. No dilated bile ducts. No ascites. OTHER TESTING:  Not available or performed    ASSESSMENT AND PLAN:  Persistent elevation in liver transaminases of unclear etiology at this time. Liver function is normal.  The platelet count is normal.      Based upon laboratory studies and imaging the patient may have significant liver injury. Serologic testing to help define the cause of the laboratory abnormality were negative. Autoimmune markers are still pending.     The most likely causes for the liver chemistry abnormalities were discussed with the patient and include fatty liver disease, immune liver disorders,     Will perform laboratory testing to monitor liver function and degree of liver injury. This included BMP, hepatic panel, CBC with platelet count, INR. The need to perform a liver biopsy to help determine the cause and severity of the liver test abnormalities was discussed. The risks of performing the liver biopsy including pain, puncture of the lung, gallbladder, intestine or kidney and bleeding were discussed. Will defer liver biopsy for now. The need to perform an assessment of liver fibrosis was discussed with the patient. The Fibroscan can assess liver fibrosis and determine if a patient has advanced fibrosis or cirrhosis without the need for liver biopsy. The Fibroscan is currently available at liver [place]. This will be performed at the next office visit. If the Fibroscan suggests advanced fibrosis then a liver biopsy should be considered. The patient was directed to continue all current medications at the current dosages. There are no contraindications for the patient to take any medications that are necessary for treatment of other medical issues. The patient was counseled regarding alcohol consumption. Vaccination for viral hepatitis A and B is recommended since the patient has no serologic evidence of previous exposure or vaccination with immunity. All of the above issues were discussed with the patient. All questions were answered. The patient expressed a clear understanding of the above. [address] [place] [address] in 4 weeks for Fibroscan, to review all data and determine the treatment plan.     Von Elam MD  [facility] [place] of [address] [place] [address] [address] [place] [address] Richie  22.  [phone]

## 2018-02-09 NOTE — MR AVS SNAPSHOT
2700 [facility] [phone] [address] [place] [address] 
[phone] Patient: Andres Downs MRN: [id number] FKK:[birth year] Visit Information Date & Time Provider Department Dept. Phone Encounter #  
 2/9/2018  1:00 PM Génesis Khan Sharri Machado of [facility] 219 [id number] Follow-up Instructions Return in about 3 months (around 5/9/2018) for NP with FS. Your Appointments 5/9/2018  8:15 AM  
ROUTINE CARE with Chiki Fisher MD  
[facility]) Appt Note: 6 month follow up appointment/  
 [address] [address]  
[phone]  
  
   
 [address] [address] Upcoming Health Maintenance Date Due  
 BREAST CANCER SCRN MAMMOGRAM 10/31/2019 PAP AKA CERVICAL CYTOLOGY 10/31/2020 DTaP/Tdap/Td series (2 - Td) 11/10/2025 Allergies as of 2/9/2018  Review Complete On: 2/9/2018 By: Dusty Doe No Known Allergies Current Immunizations  Reviewed on 11/10/2015 Name Date Tdap 11/10/2015 Not reviewed this visit You Were Diagnosed With   
  
 Codes Comments Elevated liver enzymes    -  Primary ICD-10-CM: R74.8 ICD-9-CM: 790.5 Vitals BP Pulse Temp Weight(growth percentile) SpO2 BMI  
 118/76 (BP 1 Location: Left arm, BP Patient Position: Sitting) 88 98.4 °F (36.9 °C) (Tympanic) 248 lb (112.5 kg) 97% 42.57 kg/m2 OB Status Smoking Status Having regular periods Never Smoker Vitals History BMI and BSA Data Body Mass Index Body Surface Area 42.57 kg/m 2 2.25 m 2 [facility] [phone] Your Updated Medication List  
  
   
This list is accurate as of: 2/9/18  2:14 PM.  Always use your most recent med list.  
  
  
  
  
 cholecalciferol (VITAMIN D3) 5,000 unit Tab tablet Commonly known as:  VITAMIN D3 Take  by mouth daily. fluticasone 50 mcg/actuation nasal spray Commonly known as:  Grant Goody 2 Sprays by Both Nostrils route daily. KRILL OIL PO Take  by mouth daily. multivitamin with iron-mineral 0.8 mg Cmpk Take  by mouth. Multivitamin-Mineral Oil pack, Lemon Peel & [employer] concentrate oil  
  
 triamcinolone acetonide 0.1 % topical cream  
Commonly known as:  KENALOG We Performed the Following ACTIN (SMOOTH MUSCLE) ANTIBODY P3714675 CPT(R)] ALPHA-1-ANTITRYPSIN, TOTAL [25899 CPT(R)] ANTINUCLEAR ANTIBODIES, IFA T0991544 CPT(R)] CBC WITH AUTOMATED DIFF [92853 CPT(R)] CERULOPLASMIN [id number] CPT(R)] FERRITIN [37303 CPT(R)] HCV AB W/REFLEX VERIFICATION [[id number] Custom] HEP A AB, TOTAL [id number] CPT(R)] HEP B SURFACE AB F8813797 CPT(R)] HEP B SURFACE AG I1393777 CPT(R)] HEPATIC FUNCTION PANEL [71128 CPT(R)] HEPATITIS B CORE AB, TOTAL J4263293 CPT(R)] IRON PROFILE M8361694 CPT(R)] METABOLIC PANEL, BASIC [84615 CPT(R)] Follow-up Instructions Return in about 3 months (around 5/9/2018) for NP with FS. Patient Instructions FIBROSCAN PATIENT INFORMATION What is Fibroscan:? 
 
Fibroscan is an ultrasound device that measures liver stiffness by sending a pulse of vibrations through the liver. This translated into an immediate result that can help your healthcare team determine the level of damage to the liver as well as monitor the condition of various liver diseases over time. Fibroscan is helpful in the evaluation of the following conditions: 
 
Chronic Hepatitis C Chronic Hepatitis B Fatty Liver Disease Alcohol Liver Disease Chronic Cholestatic Liver Diseases What happens During the Scan?  
 
Patients receiving this exam lie flat on an examination table and raise the right arm above the head. The skin over the right lower rib cage is exposed and the examiner locates the correct area to be scanned. The prove of the scanner is placed directly on the patient and triggered to start. This fells like a gentle flick against the skin and should not be uncomfortable. At least ten (10) readings are taken and the average is calculated to score the amount of liver stiffness or scar tissue. The exam should take 10-20 minutes. What do I need to do to prepare for the scan? Please do not eat or drink anything 2-4 hours  Before your Fibroscan. You should continue taking any prescribed medication and can take small sips of water or clear fluid to do so,  But avoid drinking large amounts of fluid. Please dress comfortably in clothes that will allow for easy access to the right side of the abdomen. Women are discouraged from wearing a dress on the day of the exam. 
 
Are there any special precautions? Patients who are pregnant or have an implantable device (for example, pacemaker or defibrillator) should not have this exam performed. Patients with a significant amount of fat tissue in the area the probe is pressed may be unable to have test performed. Introducing [facility] SERVICES! Dear Per Goodwin: 
Thank you for requesting a [employer] account. Our records indicate that you already have an active [employer] account. You can access your account anytime at [email or web address] Did you know that you can access your hospital and ER discharge instructions at any time in [employer]? You can also review all of your test results from your hospital stay or ER visit. Additional Information If you have questions, please visit the Frequently Asked Questions section of the [employer] website at [email or web address] Remember, [employer] is NOT to be used for urgent needs. For medical emergencies, dial 911. Now available from your iPhone and Android! Please provide this summary of care documentation to your next provider. Your primary care clinician is listed as SYLVIA DOS SANTOS. If you have any questions after today's visit, please call [phone].

## 2018-02-10 LAB
A1AT SERPL-MCNC: 139 MG/DL (ref 90–200)
ALBUMIN SERPL-MCNC: 4.9 G/DL (ref 3.5–5.5)
ALP SERPL-CCNC: 60 IU/L (ref 39–117)
ALT SERPL-CCNC: 70 IU/L (ref 0–32)
AST SERPL-CCNC: 58 IU/L (ref 0–40)
BASOPHILS # BLD AUTO: 0 X10E3/UL (ref 0–0.2)
BASOPHILS NFR BLD AUTO: 0 %
BILIRUB DIRECT SERPL-MCNC: 0.13 MG/DL (ref 0–0.4)
BILIRUB SERPL-MCNC: 0.3 MG/DL (ref 0–1.2)
BUN SERPL-MCNC: 15 MG/DL (ref 6–24)
BUN/CREAT SERPL: 22 (ref 9–23)
CALCIUM SERPL-MCNC: 10.4 MG/DL (ref 8.7–10.2)
CERULOPLASMIN SERPL-MCNC: 31.7 MG/DL (ref 19–39)
CHLORIDE SERPL-SCNC: 102 MMOL/L (ref 96–106)
CO2 SERPL-SCNC: 25 MMOL/L (ref 18–29)
COMMENT, 144067: NORMAL
CREAT SERPL-MCNC: 0.67 MG/DL (ref 0.57–1)
EOSINOPHIL # BLD AUTO: 0.1 X10E3/UL (ref 0–0.4)
EOSINOPHIL NFR BLD AUTO: 2 %
ERYTHROCYTE [DISTWIDTH] IN BLOOD BY AUTOMATED COUNT: 14.3 % (ref 12.3–15.4)
FERRITIN SERPL-MCNC: 135 NG/ML (ref 15–150)
GFR SERPLBLD CREATININE-BSD FMLA CKD-EPI: 106 ML/MIN/1.73
GFR SERPLBLD CREATININE-BSD FMLA CKD-EPI: 123 ML/MIN/1.73
GLUCOSE SERPL-MCNC: 120 MG/DL (ref 65–99)
HAV AB SER QL IA: NEGATIVE
HBV CORE AB SERPL QL IA: NEGATIVE
HBV SURFACE AB SER QL: NON REACTIVE
HBV SURFACE AG SERPL QL IA: NEGATIVE
HCT VFR BLD AUTO: 42.8 % (ref 34–46.6)
HCV AB S/CO SERPL IA: <0.1 S/CO RATIO (ref 0–0.9)
HGB BLD-MCNC: 14.2 G/DL (ref 11.1–15.9)
IMM GRANULOCYTES # BLD: 0 X10E3/UL (ref 0–0.1)
IMM GRANULOCYTES NFR BLD: 0 %
IRON SATN MFR SERPL: 16 % (ref 15–55)
IRON SERPL-MCNC: 52 UG/DL (ref 27–159)
LYMPHOCYTES # BLD AUTO: 2.8 X10E3/UL (ref 0.7–3.1)
LYMPHOCYTES NFR BLD AUTO: 36 %
MCH RBC QN AUTO: 28 PG (ref 26.6–33)
MCHC RBC AUTO-ENTMCNC: 33.2 G/DL (ref 31.5–35.7)
MCV RBC AUTO: 84 FL (ref 79–97)
MONOCYTES # BLD AUTO: 0.5 X10E3/UL (ref 0.1–0.9)
MONOCYTES NFR BLD AUTO: 6 %
NEUTROPHILS # BLD AUTO: 4.3 X10E3/UL (ref 1.4–7)
NEUTROPHILS NFR BLD AUTO: 56 %
PLATELET # BLD AUTO: 257 X10E3/UL (ref 150–379)
POTASSIUM SERPL-SCNC: 4.4 MMOL/L (ref 3.5–5.2)
PROT SERPL-MCNC: 7.8 G/DL (ref 6–8.5)
RBC # BLD AUTO: 5.08 X10E6/UL (ref 3.77–5.28)
SODIUM SERPL-SCNC: 143 MMOL/L (ref 134–144)
TIBC SERPL-MCNC: 332 UG/DL (ref 250–450)
UIBC SERPL-MCNC: 280 UG/DL (ref 131–425)
WBC # BLD AUTO: 7.7 X10E3/UL (ref 3.4–10.8)

## 2018-02-11 LAB — ACTIN IGG SERPL-ACNC: 16 UNITS (ref 0–19)

## 2018-02-12 LAB — ANA TITR SER IF: NEGATIVE {TITER}

## 2018-02-23 ENCOUNTER — HOSPITAL ENCOUNTER (OUTPATIENT)
Dept: CT IMAGING | Age: 48
Discharge: HOME | End: 2018-02-23
Attending: FAMILY MEDICINE
Payer: SELF-PAY

## 2018-02-23 DIAGNOSIS — Z82.49 FAMILY HISTORY OF HEART DISEASE: ICD-10-CM

## 2018-02-23 PROCEDURE — 75571 CT HRT W/O DYE W/CA TEST: CPT

## 2018-02-25 ENCOUNTER — TELEPHONE (OUTPATIENT)
Dept: FAMILY MEDICINE CLINIC | Age: 48
End: 2018-02-25

## 2018-02-25 NOTE — TELEPHONE ENCOUNTER
Total calcium score: 0     Calcium score interpretation:  0-0 = No evidence of CAD  1-10 = Minimal evidence of CAD   = Mild evidence of CAD  101-400 = Moderate evidence of CAD  >400 = Extensive evidence of CAD    Your score of 0 places you in the 10th percentile rank. That means that 90% of  the females at the ages from 45-41 will have a higher calcium score than you. IMPRESSION: Total calcium score of 0. There is no evidence of calcified plaque. This is a \"negative\" examination. There is a greater than 97% chance for absence of coronary artery disease. We still need to focus on lifestyle modification w/ diet/exercise/wt mgt as we discussed at her recent appt in order to keep her future risk low as well. I have included some tips at the end of this message. Her lungs were normal.   It also picked up severe fatty liver, but she is already seeing Dr Sue Muñoz about that. Meal Planning and Exercise:  ~Avoid sweets and sugars. ~Limit carbohydrate intake to between 30-45 grams of carbs max per meal and no more than 15 grams of carbs per snack  ~Do not skip meals. ~Eat light snacks between meals that are low in fat and high in protein. ~Divide your plate by types of foods. Put non-starchy vegetables on half the plate, meat or other protein food on one-quarter of the plate, and a grain or starchy vegetable in the final quarter of the plate. Keep starches dark in color trying to void white starches in general.  ~Limit alcohol to no more than 1 standard drink per day for women and 2 for men (ie/ 12 oz beer, 5 oz wine, 1.5 oz liquor). ~Get regular moderate intensity cardio/aerobic exercise at least 150 minutes per week ie/ 30-50 minutes 3-4 x a week. ~Reference the ADA (American Diabetes Association Diet) for additional nutrition tips. ~We can offer referral to a certified diabetes educator or our nutrition services programs if needed.

## 2018-02-26 NOTE — TELEPHONE ENCOUNTER
PI of results. I let her know that I would be sending over the diet guidelines via [employer].

## 2018-04-23 ENCOUNTER — TELEPHONE (OUTPATIENT)
Dept: FAMILY MEDICINE CLINIC | Age: 48
End: 2018-04-23

## 2018-04-23 DIAGNOSIS — K76.0 FATTY LIVER: ICD-10-CM

## 2018-04-23 DIAGNOSIS — R73.03 PREDIABETES: Primary | ICD-10-CM

## 2018-04-23 DIAGNOSIS — E78.2 MIXED HYPERLIPIDEMIA: ICD-10-CM

## 2018-04-23 NOTE — TELEPHONE ENCOUNTER
----- Message from Branden Golden sent at 4/23/2018  3:24 PM EDT -----  Regarding: Visit Follow-Up Question  Contact: [phone]  Dieter Pete,   Please submit an order for blood work to be drawn. I'd like to come in early next week for a fasting blood work so the results could be ready to be reviewed with you during my follow up appointment on May 9, 2018.      Thank you,   Mary Fagan

## 2018-04-30 PROBLEM — K76.0 FATTY LIVER: Status: ACTIVE | Noted: 2018-04-30

## 2018-04-30 PROBLEM — R73.03 PREDIABETES: Status: ACTIVE | Noted: 2018-04-30

## 2018-04-30 PROBLEM — E78.2 MIXED HYPERLIPIDEMIA: Status: ACTIVE | Noted: 2018-04-30

## 2018-05-04 LAB
ALBUMIN SERPL-MCNC: 4.6 G/DL (ref 3.5–5.5)
ALP SERPL-CCNC: 48 IU/L (ref 39–117)
ALT SERPL-CCNC: 27 IU/L (ref 0–32)
APPEARANCE UR: CLEAR
AST SERPL-CCNC: 22 IU/L (ref 0–40)
BACTERIA #/AREA URNS HPF: NORMAL /[HPF]
BILIRUB DIRECT SERPL-MCNC: 0.12 MG/DL (ref 0–0.4)
BILIRUB SERPL-MCNC: 0.4 MG/DL (ref 0–1.2)
BILIRUB UR QL STRIP: NEGATIVE
CASTS URNS QL MICRO: NORMAL /LPF
CHOLEST SERPL-MCNC: 189 MG/DL (ref 100–199)
COLOR UR: YELLOW
EPI CELLS #/AREA URNS HPF: NORMAL /HPF
EST. AVERAGE GLUCOSE BLD GHB EST-MCNC: 117 MG/DL
GLUCOSE SERPL-MCNC: 107 MG/DL (ref 65–99)
GLUCOSE UR QL: NEGATIVE
HBA1C MFR BLD: 5.7 % (ref 4.8–5.6)
HDLC SERPL-MCNC: 50 MG/DL
HGB UR QL STRIP: NEGATIVE
INTERPRETATION, 910389: NORMAL
KETONES UR QL STRIP: NEGATIVE
LDLC SERPL CALC-MCNC: 115 MG/DL (ref 0–99)
LEUKOCYTE ESTERASE UR QL STRIP: NEGATIVE
MICRO URNS: NORMAL
MICRO URNS: NORMAL
MUCOUS THREADS URNS QL MICRO: PRESENT
NITRITE UR QL STRIP: NEGATIVE
PH UR STRIP: 5 [PH] (ref 5–7.5)
PROT SERPL-MCNC: 7.4 G/DL (ref 6–8.5)
PROT UR QL STRIP: NEGATIVE
RBC #/AREA URNS HPF: NORMAL /HPF
SP GR UR: 1.02 (ref 1–1.03)
TRIGL SERPL-MCNC: 122 MG/DL (ref 0–149)
UROBILINOGEN UR STRIP-MCNC: 0.2 MG/DL (ref 0.2–1)
VLDLC SERPL CALC-MCNC: 24 MG/DL (ref 5–40)
WBC #/AREA URNS HPF: NORMAL /HPF

## 2018-05-09 ENCOUNTER — OFFICE VISIT (OUTPATIENT)
Dept: FAMILY MEDICINE CLINIC | Age: 48
End: 2018-05-09

## 2018-05-09 VITALS
HEART RATE: 68 BPM | WEIGHT: 232 LBS | SYSTOLIC BLOOD PRESSURE: 112 MMHG | BODY MASS INDEX: 39.61 KG/M2 | DIASTOLIC BLOOD PRESSURE: 70 MMHG | HEIGHT: 64 IN | OXYGEN SATURATION: 98 % | RESPIRATION RATE: 18 BRPM | TEMPERATURE: 98.2 F

## 2018-05-09 DIAGNOSIS — K76.0 FATTY LIVER: ICD-10-CM

## 2018-05-09 DIAGNOSIS — R73.03 PREDIABETES: ICD-10-CM

## 2018-05-09 DIAGNOSIS — E66.01 SEVERE OBESITY (BMI 35.0-39.9) WITH COMORBIDITY (HCC): ICD-10-CM

## 2018-05-09 DIAGNOSIS — E78.2 MIXED HYPERLIPIDEMIA: Primary | ICD-10-CM

## 2018-05-09 NOTE — ASSESSMENT & PLAN NOTE
Improving, based on history, physical exam and review of pertinent labs, studies and medications; meds reconciled; continue current treatment plan, lifestyle modifications recommended.   Lab Results  Component Value Date/Time   Cholesterol, total 189 05/03/2018 08:06 AM   HDL Cholesterol 50 05/03/2018 08:06 AM   LDL, calculated 115 (H) 05/03/2018 08:06 AM   Triglyceride 122 05/03/2018 08:06 AM

## 2018-05-09 NOTE — PATIENT INSTRUCTIONS
Hyperlipidemia: After Your Visit  Your Care Instructions  Hyperlipidemia is too much fat in your blood. The body has several kinds of fat, including cholesterol and triglycerides. Your body needs fat for many things, such as making new cells. But too much fat in your blood increases your chances of having a heart attack or stroke. You may be able to lower your cholesterol and triglycerides with a heart-healthy diet, exercise, and if needed, medicine. Your doctor may want you to try lifestyle changes first to see whether they lower the fat in your blood. You may need to take medicine if lifestyle changes do not lower the fat in your blood enough. Follow-up care is a key part of your treatment and safety. Be sure to make and go to all appointments, and call your doctor if you are having problems. Its also a good idea to know your test results and keep a list of the medicines you take. How can you care for yourself at home? Take your medicines  · Take your medicines exactly as prescribed. Call your doctor if you think you are having a problem with your medicine. · If you take medicine to lower your cholesterol, go to follow-up visits. You will need to have blood tests. · Do not take large doses of niacin, which is a B vitamin, while taking medicine called statins. It may increase the chance of muscle pain and liver problems. · Talk to your doctor about avoiding grapefruit juice if you are taking statins. Grapefruit juice can raise the level of this medicine in your blood. This could increase side effects. Eat more fruits, vegetables, and fiber  · Fruits and vegetables have lots of nutrients that help protect against heart disease, and they have little--if any--fat. Try to eat at least five servings a day. Dark green, deep orange, or yellow fruits and vegetables are healthy choices. · Keep carrots, celery, and other veggies handy for snacks.  Buy fruit that is in season and store it where you can see it so that you will be tempted to eat it. Cook dishes that have a lot of veggies in them, such as stir-fries and soups. · Foods high in fiber may reduce your cholesterol and provide important vitamins and minerals. High-fiber foods include whole-grain cereals and breads, oatmeal, beans, brown rice, citrus fruits, and apples. · Buy whole-grain breads and cereals instead of white bread and pastries. Limit saturated fat  · Read food labels and try to avoid saturated fat and trans fat. They increase your risk of heart disease. · Use olive or canola oil when you cook. Try cholesterol-lowering spreads, such as Benecol or Take Control. · Bake, broil, grill, or steam foods instead of frying them. · Limit the amount of high-fat meats you eat, including hot dogs and sausages. Cut out all visible fat when you prepare meat. · Eat fish, skinless poultry, and soy products such as tofu instead of high-fat meats. Soybeans may be especially good for your heart. Eat at least two servings of fish a week. Certain fish, such as salmon, contain omega-3 fatty acids, which may help reduce your risk of heart attack. · Choose low-fat or fat-free milk and dairy products. Get exercise, limit alcohol, and quit smoking  · Get more exercise. Work with your doctor to set up an exercise program. Even if you can do only a small amount, exercise will help you get stronger, have more energy, and manage your weight and your stress. Walking is an easy way to get exercise. Gradually increase the amount you walk every day. Aim for at least 30 minutes on most days of the week. You also may want to swim, bike, or do other activities. · Limit alcohol to no more than 2 drinks a day for men and 1 drink a day for women. · Do not smoke. If you need help quitting, talk to your doctor about stop-smoking programs and medicines. These can increase your chances of quitting for good. When should you call for help?   Call 911 anytime you think you may need emergency care. For example, call if:  · You have symptoms of a heart attack. These may include:  ¨ Chest pain or pressure, or a strange feeling in the chest.  ¨ Sweating. ¨ Shortness of breath. ¨ Nausea or vomiting. ¨ Pain, pressure, or a strange feeling in the back, neck, jaw, or upper belly or in one or both shoulders or arms. ¨ Lightheadedness or sudden weakness. ¨ A fast or irregular heartbeat. After you call 911, the  may tell you to chew 1 adult-strength or 2 to 4 low-dose aspirin. Wait for an ambulance. Do not try to drive yourself. · You have signs of a stroke. These may include:  ¨ Sudden numbness, paralysis, or weakness in your face, arm, or leg, especially on only one side of your body. ¨ New problems with walking or balance. ¨ Sudden vision changes. ¨ Drooling or slurred speech. ¨ New problems speaking or understanding simple statements, or feeling confused. ¨ A sudden, severe headache that is different from past headaches. · You passed out (lost consciousness). Call your doctor now or seek immediate medical care if:  · You have muscle pain or weakness. Watch closely for changes in your health, and be sure to contact your doctor if:  · You are very tired. · You have an upset stomach, gas, constipation, or belly pain or cramps. Where can you learn more? Go to [employer]  Enter C406 in the search box to learn more about \"Hyperlipidemia: After Your Visit. \"   © 4759-8461 [employer]. Care instructions adapted under license by [employer] (which disclaims liability or warranty for this information). This care instruction is for use with your licensed healthcare professional. If you have questions about a medical condition or this instruction, always ask your healthcare professional. [place] [address] any warranty or liability for your use of this information.   Content Version: 4.4.727386; Last Revised: October 13, 2011

## 2018-05-09 NOTE — PROGRESS NOTES
HISTORY OF PRESENT ILLNESS   HPI  6 month follow up prediabetes, hyperlipidemia, fatty liver. Had fasting labs done 5-3-18. Since 2-2018 she started doing the Keto Diet and has increased her steps using the Fit Bit. Her weight is down 15 lbs since then and she is overall feeling much better. Lefor that she is having to change her wardrobe. Her numbers have all improved very nicely. REVIEW OF SYMPTOMS     Review of Systems   Constitutional: Negative. Respiratory: Negative. Cardiovascular: Negative. Gastrointestinal: Negative.        PROBLEM LIST/MEDICAL HISTORY      Problem List  Date Reviewed: 5/9/2018          Codes Class Noted    Severe obesity (BMI 35.0-39. 9) with comorbidity ([facility] [address] ICD-10-CM: E66.01  ICD-9-CM: 278.01  5/9/2018        Prediabetes ICD-10-CM: R73.03  ICD-9-CM: 790.29  4/30/2018    Overview Addendum 4/30/2018  2:19 PM by Rich Mccarthy MD                  Mixed hyperlipidemia ICD-10-CM: E78.2  ICD-9-CM: 272.2  4/30/2018        Fatty liver ICD-10-CM: K76.0  ICD-9-CM: 571.8  4/30/2018    Overview Signed 4/30/2018  2:23 PM by Rich Mccarthy MD     Hepatology: Dr Roman Monroe 2-2018             H/O arthroscopic knee surgery ICD-10-CM: M32.682  ICD-9-CM: V45.89  2/9/2018        Chronic cough ICD-10-CM: R05  ICD-9-CM: 786.2  11/3/2017    Overview Addendum 11/3/2017 12:00 PM by Rich Mccarthy MD     Evaluation 2014; Dr Ayah Martinez & Dr Loren Varner (same group), Pulmonary evaluation summer 2017: CXR and PFT's reportedly normal: dx heartburn and allergies; referred to Allergist (Dr Aditi Ayala)              Vitamin D deficiency ICD-10-CM: E55.9  ICD-9-CM: 268.9  11/3/2017    Overview Signed 11/3/2017 12:18 PM by Rich Mccarthy MD     2014             Plantar fasciitis, bilateral ICD-10-CM: M72.2  ICD-9-CM: 728.71  1/3/2014        Obesity ICD-10-CM: U50.7  ICD-9-CM: 278.00  Unknown        Hypercholesterolemia ICD-10-CM: E78.00  ICD-9-CM: 272.0  2/1/2011    Overview Signed 2011 10:20 AM by Garcia Davenport MD                  History of gestational diabetes mellitus, diet controlled ICD-10-CM: Z86.32  ICD-9-CM: V12.21  2011    Overview Addendum 2011  5:16 PM by Garcia Davenport MD     2003, 2007             AR (allergic rhinitis) ICD-10-CM: J30.9  ICD-9-CM: 477.9  2011    Overview Signed 11/3/2017 11:48 AM by Garcia Davenport MD     Blood testing in the : mold: Re-evaluation Dr Gema Antonio at [place] [address] 10-11-17: skin testing: + trees, oaks, ragweed, grass, weeds, dust mites, mold             GERD (gastroesophageal reflux disease) ICD-10-CM: K21.9  ICD-9-CM: 530.81  2011        LBP (low back pain) ICD-10-CM: M54.5  ICD-9-CM: 724.2  2011                  PAST SURGICAL HISTORY       Past Surgical History:   Procedure Laterality Date    ELECTROCARDIOGRAM REPORT      normal    HX ACL RECONSTRUCTION  1997    right    HX BREAST BIOPSY  2002    benign, left breast    HX  SECTION  2003, 2007    HX CYST REMOVAL  1993    fallopian tube    HX LIPOMA RESECTION  2005    left arm    HX TONSIL AND ADENOIDECTOMY  11yo    HX TYMPANOSTOMY      multiple in her childhood up until T&A age 12 yo    GA BIOPSY OF BREAST, NEEDLE CORE  10/15/2015    Yolanda         MEDICATIONS      Current Outpatient Prescriptions   Medication Sig    triamcinolone acetonide (KENALOG) 0.1 % topical cream     cholecalciferol, VITAMIN D3, (VITAMIN D3) 5,000 unit tab tablet Take  by mouth daily.  fluticasone (FLONASE) 50 mcg/actuation nasal spray 2 Sprays by Both Nostrils route daily. (Patient taking differently: 2 Sprays by Both Nostrils route daily. Since 10-11-17 per Allergist)    KRILL OIL PO Take  by mouth daily.  multivitamin with iron-mineral 0.8 mg cmpk Take  by mouth. Multivitamin-Mineral Oil pack, Lemon [employer] concentrate oil     No current facility-administered medications for this visit. ALLERGIES   No Known Allergies       SOCIAL HISTORY       Social History     Social History    Marital status:      Spouse name: N/A    Number of children: 2    Years of education: N/A     Occupational History          Social History Main Topics    Smoking status: Never Smoker    Smokeless tobacco: Never Used    Alcohol use Yes      Comment: Rare, socially, cocktail or wine    Drug use: No    Sexual activity: Yes     Partners: Male     Birth control/ protection: Condom     Other Topics Concern    Caffeine Concern No     1 mug of coffee a day; tea a few times a week    Weight Concern Yes     happy her wt is coming down, down 15 lbs since starting keto diet 2-2018    Special Diet Yes     Since 2-2018: started Keto Diet: no carbs    Exercise No     nothing regular just using her Fit Bit and increasing her steps     Social History Narrative    [place]; 1 son, 1 daughter        IMMUNIZATIONS  Immunization History   Administered Date(s) Administered    Tdap 11/10/2015         FAMILY HISTORY     Family History   Problem Relation Age of Onset    High Cholesterol Mother      on medication    High Cholesterol Father      living    Heart Disease Father [address]     Breast Cancer Maternal Grandmother 61    Arthritis-osteo Maternal Grandmother     Diabetes Maternal Grandmother     Osteoporosis Maternal Grandmother     Cancer Maternal Grandmother 82     Liver    Diabetes Maternal Grandfather     Heart Disease Maternal Grandfather     High Cholesterol Maternal Grandfather     Stroke Maternal Grandfather 79    Diabetes Paternal Grandfather     Glaucoma Paternal Grandfather     Diabetes Paternal Grandmother     Obesity Brother     Allergic Rhinitis Son      skin testing, multiple allergies    Breast Cancer Maternal Aunt          VITALS     Visit Vitals    /70 (BP 1 Location: Left arm, BP Patient Position: Sitting)    Pulse 68    Temp 98.2 °F (36.8 °C) (Oral)    Resp 18    Ht 5' 4\" (1.626 m)    Wt 232 lb (105.2 kg)    LMP 04/12/2018    SpO2 98%    BMI 39.82 kg/m2          PHYSICAL EXAMINATION     Physical Exam   Constitutional: No distress. Cardiovascular: Normal rate and regular rhythm. Pulmonary/Chest: Effort normal and breath sounds normal. No respiratory distress. Vitals reviewed.          DIAGNOSTIC DATA         LABORATORY DATA       Lab Results  Component Value Date/Time   WBC 7.7 02/09/2018 02:18 PM   HGB 14.2 02/09/2018 02:18 PM   HCT 42.8 02/09/2018 02:18 PM   PLATELET 405 83/94/4077 02:18 PM   MCV 84 02/09/2018 02:18 PM     Lab Results  Component Value Date/Time   Cholesterol, total 189 05/03/2018 08:06 AM   HDL Cholesterol 50 05/03/2018 08:06 AM   LDL, calculated 115 (H) 05/03/2018 08:06 AM   Triglyceride 122 05/03/2018 08:06 AM     Lab Results  Component Value Date/Time   TSH 2.850 11/03/2017 12:21 PM      Lab Results   Component Value Date/Time    Sodium 143 02/09/2018 02:18 PM    Potassium 4.4 02/09/2018 02:18 PM    Chloride 102 02/09/2018 02:18 PM    CO2 25 02/09/2018 02:18 PM    Glucose 107 (H) 05/03/2018 08:06 AM    BUN 15 02/09/2018 02:18 PM    Creatinine 0.67 02/09/2018 02:18 PM    BUN/Creatinine ratio 22 02/09/2018 02:18 PM    GFR est  02/09/2018 02:18 PM    GFR est non- 02/09/2018 02:18 PM    Calcium 10.4 (H) 02/09/2018 02:18 PM    Bilirubin, total 0.4 05/03/2018 08:06 AM    ALT (SGPT) 27 05/03/2018 08:06 AM    AST (SGOT) 22 05/03/2018 08:06 AM    Alk. phosphatase 48 05/03/2018 08:06 AM    Protein, total 7.4 05/03/2018 08:06 AM    Albumin 4.6 05/03/2018 08:06 AM    A-G Ratio 1.7 11/03/2017 12:21 PM      Lab Results   Component Value Date/Time    Hemoglobin A1c 5.7 (H) 05/03/2018 08:06 AM          ASSESSMENT & PLAN   Diagnoses and all orders for this visit:    1.  Mixed hyperlipidemia  Assessment & Plan:   Cardiovascular risk and specific lipid/LDL goals reviewed  Improving, based on history, physical exam and review of pertinent labs, studies and medications; meds reconciled; continue current treatment plan, lifestyle modifications recommended. Lab Results  Component Value Date/Time   Cholesterol, total 189 05/03/2018 08:06 AM   HDL Cholesterol 50 05/03/2018 08:06 AM   LDL, calculated 115 (H) 05/03/2018 08:06 AM   Triglyceride 122 05/03/2018 08:06 AM         2. Prediabetes  Assessment & Plan:  Well Controlled, based on history, physical exam and review of pertinent labs, studies and medications; meds reconciled; continue current treatment plan, lifestyle modifications recommended. Lab Results  Component Value Date/Time   Hemoglobin A1c 5.7 05/03/2018 08:06 AM   Glucose 107 05/03/2018 08:06 AM             3. Fatty liver  Assessment & Plan:  Improving, based on history, physical exam and review of pertinent labs, studies and medications; meds reconciled; continue current treatment plan, lifestyle modifications recommended. Patient followed by Hepatology Dr Erin Whalen  She has had significant improvement in her liver enzymes since being on keto diet  Lab Results  Component Value Date/Time   ALT (SGPT) 27 05/03/2018 08:06 AM   AST (SGOT) 22 05/03/2018 08:06 AM   Alk. phosphatase 48 05/03/2018 08:06 AM   Bilirubin, direct 0.12 05/03/2018 08:06 AM   Bilirubin, total 0.4 05/03/2018 08:06 AM   Albumin 4.6 05/03/2018 08:06 AM   Protein, total 7.4 05/03/2018 08:06 AM   PLATELET 246 04/56/4403 02:18 PM           4. Severe obesity (BMI 35.0-39. 9) with comorbidity [facility])  Assessment & Plan:  Patient counseled about current BMI, goals, diet, nutrition, exercise, weight management. Nutrition/meal planning discussed. Monitor weights. Appropriate patient education materials included with or without corresponding AVS via handout or MyChart if activated. Reassess at next follow up visit. Commended on her progress thus far. RTC fasting CPE .  Follow up sooner prn

## 2018-05-09 NOTE — ASSESSMENT & PLAN NOTE
Improving, based on history, physical exam and review of pertinent labs, studies and medications; meds reconciled; continue current treatment plan, lifestyle modifications recommended. Patient followed by Hepatology Dr Hemanth Garcia  She has had significant improvement in her liver enzymes since being on keto diet  Lab Results  Component Value Date/Time   ALT (SGPT) 27 05/03/2018 08:06 AM   AST (SGOT) 22 05/03/2018 08:06 AM   Alk.  phosphatase 48 05/03/2018 08:06 AM   Bilirubin, direct 0.12 05/03/2018 08:06 AM   Bilirubin, total 0.4 05/03/2018 08:06 AM   Albumin 4.6 05/03/2018 08:06 AM   Protein, total 7.4 05/03/2018 08:06 AM   PLATELET 509 34/26/1705 02:18 PM

## 2018-05-09 NOTE — ASSESSMENT & PLAN NOTE
Well Controlled, based on history, physical exam and review of pertinent labs, studies and medications; meds reconciled; continue current treatment plan, lifestyle modifications recommended.   Lab Results  Component Value Date/Time   Hemoglobin A1c 5.7 05/03/2018 08:06 AM   Glucose 107 05/03/2018 08:06 AM

## 2018-05-09 NOTE — PROGRESS NOTES
Chief Complaint   Patient presents with    Follow-up     6 month f/u      1. Have you been to the ER, urgent care clinic since your last visit? Hospitalized since your last visit? No    2. Have you seen or consulted any other health care providers outside of the [address] [place] [address] since your last visit? Include any pap smears or colon screening.  No

## 2018-05-14 ENCOUNTER — OFFICE VISIT (OUTPATIENT)
Dept: HEMATOLOGY | Age: 48
End: 2018-05-14

## 2018-05-14 VITALS
SYSTOLIC BLOOD PRESSURE: 119 MMHG | WEIGHT: 231.8 LBS | HEIGHT: 64 IN | OXYGEN SATURATION: 100 % | TEMPERATURE: 98.1 F | DIASTOLIC BLOOD PRESSURE: 65 MMHG | BODY MASS INDEX: 39.57 KG/M2 | HEART RATE: 80 BPM

## 2018-05-14 DIAGNOSIS — K76.0 FATTY LIVER: Primary | ICD-10-CM

## 2018-05-14 NOTE — PROGRESS NOTES
Chief Complaint   Patient presents with    Follow-up     FIBROSCAN     Visit Vitals    /65 (BP 1 Location: Right arm, BP Patient Position: Sitting)    Pulse 80    Temp 98.1 °F (36.7 °C) (Tympanic)    Ht 5' 4\" (1.626 m)    Wt 231 lb 12.8 oz (105.1 kg)    SpO2 100%    BMI 39.79 kg/m2     PHQ over the last two weeks 2/9/2018   Little interest or pleasure in doing things Not at all   Feeling down, depressed or hopeless Not at all   Total Score PHQ 2 0

## 2018-05-14 NOTE — PROGRESS NOTES
70 Inocencia Conte MD, [address], [place], [place]       Shahrzad Sexton, NP    Barry Reilly, JUSTO Red, [facility]   Maxwell Otto, HATTIE Poon, HATTIE [facility] 136    at [address], [address] Richie Jiang  22.    [phone]    FAX: [address] [place] [address], [address], [address]    [phone]    FAX: [phone]         Patient Care Team:  Paul Pompa MD as PCP - General      Problem List  Date Reviewed: 5/9/2018          Codes Class Noted    Severe obesity (BMI 35.0-39. 9) with comorbidity ([facility] [address] ICD-10-CM: E66.01  ICD-9-CM: 278.01  5/9/2018        Prediabetes ICD-10-CM: R73.03  ICD-9-CM: 790.29  4/30/2018    Overview Addendum 4/30/2018  2:19 PM by Paul Pompa MD                  Mixed hyperlipidemia ICD-10-CM: E78.2  ICD-9-CM: 272.2  4/30/2018        Fatty liver ICD-10-CM: K76.0  ICD-9-CM: 571.8  4/30/2018    Overview Signed 4/30/2018  2:23 PM by Paul Pompa MD     Hepatology: Dr Mary Lu 2-2018             H/O arthroscopic knee surgery ICD-10-CM: G17.049  ICD-9-CM: V45.89  2/9/2018        Chronic cough ICD-10-CM: R05  ICD-9-CM: 786.2  11/3/2017    Overview Addendum 11/3/2017 12:00 PM by Paul Pompa MD     Evaluation 2014; Dr Fer Garcia & Dr Vic Irving (same group), Pulmonary evaluation summer 2017: CXR and PFT's reportedly normal: dx heartburn and allergies; referred to Allergist (Dr Gema Antonio)              Vitamin D deficiency ICD-10-CM: E55.9  ICD-9-CM: 268.9  11/3/2017    Overview Signed 11/3/2017 12:18 PM by Paul Pompa MD     2014             Plantar fasciitis, bilateral ICD-10-CM: M72.2  ICD-9-CM: 728.71  1/3/2014        Obesity ICD-10-CM: E66.9  ICD-9-CM: 278.00  Unknown        Hypercholesterolemia ICD-10-CM: E78.00  ICD-9-CM: 272.0  2/1/2011    Overview Signed 2/1/2011 10:20 AM by Analy Dahl MD     2004             History of gestational diabetes mellitus, diet controlled ICD-10-CM: Z86.32  ICD-9-CM: V12.21  2/1/2011    Overview Addendum 2/1/2011  5:16 PM by Analy Dahl MD     6/2003, 6/2007             AR (allergic rhinitis) ICD-10-CM: J30.9  ICD-9-CM: 477.9  1/31/2011    Overview Signed 11/3/2017 11:48 AM by Analy Dahl MD     Blood testing in the 1990's: mold: Re-evaluation Dr Florence Gentile at [place] [address] 10-11-17: skin testing: + trees, oaks, ragweed, grass, weeds, dust mites, mold             GERD (gastroesophageal reflux disease) ICD-10-CM: K21.9  ICD-9-CM: 530.81  1/31/2011        LBP (low back pain) ICD-10-CM: M54.5  ICD-9-CM: 724.2  1/31/2011            Nola Oliver returns to the [address] for management of elevated liver enzymes. All results were reviewed with the patient. The patient is a 57 y.o.  female who was first noted to have abnormalities in liver transaminases in 2014. Serologic evaluation for markers of chronic liver disease were negative. Ultrasound of the liver was performed in 11/2017. The results of the imaging suggested fatty liver disease. An assessment of liver fibrosis with elastography will be performed this visit. The most recent laboratory studies indicate the liver transaminases are elevated, ALP is normal, tests of hepatic synthetic and metabolic function are normal and the platelet count is normal.    The patient has no symptoms which could be attributed to the liver disorder. The patient completes all daily activities without any functional limitations. The patient has not experienced fatigue or pain in the right side over the liver.     ALLERGIES  No Known Allergies    MEDICATIONS  Current Outpatient Prescriptions Medication Sig    triamcinolone acetonide (KENALOG) 0.1 % topical cream     cholecalciferol, VITAMIN D3, (VITAMIN D3) 5,000 unit tab tablet Take  by mouth daily.  fluticasone (FLONASE) 50 mcg/actuation nasal spray 2 Sprays by Both Nostrils route daily. (Patient taking differently: 2 Sprays by Both Nostrils route daily. Since 10-11-17 per Allergist)    KRILL OIL PO Take  by mouth daily.  multivitamin with iron-mineral 0.8 mg cmpk Take  by mouth. Multivitamin-Mineral Oil pack, Lemon Peel & [employer] concentrate oil     No current facility-administered medications for this visit. SYSTEM REVIEW NOT RELATED TO LIVER DISEASE OR REVIEWED ABOVE:  Constitution systems: Negative for fever, chills, weight gain, weight loss. Eyes: Negative for visual changes. ENT: Negative for sore throat, painful swallowing. Respiratory: Negative for cough, hemoptysis, SOB. Cardiology: Negative for chest pain, palpitations. GI:  Negative for constipation or diarrhea. : Negative for urinary frequency, dysuria, hematuria, nocturia. Skin: Negative for rash. Hematology: Negative for easy bruising, blood clots. Musculo-skelatal: Negative for back pain, muscle pain, weakness. Neurologic: Negative for headaches, dizziness, vertigo, memory problems not related to HE. Psychology: Negative for anxiety, depression. FAMILY HISTORY:  The father has the following chronic diseases: heart disease. The mother is alive and healthy. There is no family history of liver disease. SOCIAL HISTORY:  The patient is . The patient has 2 children. The patient has never used tobacco products. The patient has never consumed significant amounts of alcohol. The patient currently works full time web design.       PHYSICAL EXAMINATION:  Visit Vitals    /65 (BP 1 Location: Right arm, BP Patient Position: Sitting)    Pulse 80    Temp 98.1 °F (36.7 °C) (Tympanic)    Ht 5' 4\" (1.626 m)    Wt 231 lb 12.8 oz (105.1 kg)    SpO2 100%    BMI 39.79 kg/m2     General: No acute distress. Obese. Eyes: Sclera anicteric. ENT: No oral lesions. Nodes: No adenopathy. Skin: No spider angiomata. No jaundice. No palmar erythema. Respiratory: Lungs clear to auscultation. Cardiovascular: Regular heart rate. No murmurs. No JVD. Abdomen: Soft non-tender. Liver size normal to percussion/palpation. Spleen not palpable. No obvious ascites. Extremities: No edema. No muscle wasting. No gross arthritic changes. Neurologic: Alert and oriented. Cranial nerves grossly intact. No asterixis. LABORATORY STUDIES:  [facility] [place] [address] Units 5/3/2018 2/9/2018 11/3/2017   WBC 3.4 - 10.8 x10E3/uL  7.7 7.1   ANC 1.4 - 7.0 x10E3/uL  4.3    HGB 11.1 - 15.9 g/dL  14.2 14.4    - 379 x10E3/uL  257 233   AST 0 - 40 IU/L 22 58 (H) 57 (H)   ALT 0 - 32 IU/L 27 70 (H) 71 (H)   Alk Phos 39 - 117 IU/L 48 60 57   Bili, Total 0.0 - 1.2 mg/dL 0.4 0.3 0.5   Bili, Direct 0.00 - 0.40 mg/dL 0.12 0.13    Albumin 3.5 - 5.5 g/dL 4.6 4.9 4.8   BUN 6 - 24 mg/dL  15 12   Creat 0.57 - 1.00 mg/dL  0.67 0.71   Na 134 - 144 mmol/L  143 139   K 3.5 - 5.2 mmol/L  4.4 4.1   Cl 96 - 106 mmol/L  102 97   CO2 18 - 29 mmol/L  25 23   Glucose 65 - 99 mg/dL 107 (H) 120 (H) 106 (H)     SEROLOGIES:  Serologies Latest Ref Rng & Units 2/9/2018   Hep A Ab, Total Negative Negative   Hep B Surface Ag Negative Negative   Hep B Core Ab, Total Negative Negative   Hep B Surface AB QL  Non Reactive   Hep C Ab 0.0 - 0.9 s/co ratio <0.1   Ferritin 15 - 150 ng/mL 135   Iron % Saturation 15 - 55 % 16   Ceruloplasmin 19.0 - 39.0 mg/dL 31.7   Alpha-1 antitrypsin level 90 - 200 mg/dL 139     LIVER HISTOLOGY:  5/2018. FibroScan performed at [facility] [address]. EkPa was 10.9. IQR/med 14%. The results suggested a fibrosis level of F3. The CAP was 299, consistent with fatty liver disease.      ENDOSCOPIC PROCEDURES:  Not available or performed    RADIOLOGY:  11/2017. Ultrasound of liver. Echogenic consistent with fatty liver. No liver mass lesions. No dilated bile ducts. No ascites. OTHER TESTING:  Not available or performed    ASSESSMENT AND PLAN:  Persistent elevation in liver transaminases due to fatty liver. She has lost 18 pounds so far and her liver enzymes have normalized. Liver function is normal.  The platelet count is normal.      Based upon laboratory studies, elastography and imaging, the patient may have significant liver injury. Serologic testing to help define the cause of the laboratory abnormality were negative. Will perform laboratory testing to monitor liver function and degree of liver injury. This included BMP, hepatic panel, CBC with platelet count and INR. The need to perform a liver biopsy to help determine the cause and severity of the liver test abnormalities was discussed. The risks of performing the liver biopsy including pain, puncture of the lung, gallbladder, intestine or kidney and bleeding were discussed. With the fibrosis level being F3, this was recommended and will be scheduled. She is not interested in clinical trials. The patient was directed to continue all current medications at the current dosages. There are no contraindications for the patient to take any medications that are necessary for treatment of other medical issues. The patient was counseled regarding alcohol consumption. Vaccination for viral hepatitis A and B is recommended since the patient has no serologic evidence of previous exposure or vaccination with immunity. All of the above issues were discussed with the patient. All questions were answered. The patient expressed a clear understanding of the above. [address] [place] [address] in 2 months for fatty liver follow up and 2 weeks after liver biopsy with MLS to review results.      KEMAR Carranza Arm-BC  Liver [place] [facility] [address] [facility], [address] Richie Jiang  22.  [phone]

## 2018-05-15 LAB
BUN SERPL-MCNC: 17 MG/DL (ref 6–24)
BUN/CREAT SERPL: 29 (ref 9–23)
CALCIUM SERPL-MCNC: 10.4 MG/DL (ref 8.7–10.2)
CHLORIDE SERPL-SCNC: 104 MMOL/L (ref 96–106)
CO2 SERPL-SCNC: 19 MMOL/L (ref 18–29)
CREAT SERPL-MCNC: 0.58 MG/DL (ref 0.57–1)
ERYTHROCYTE [DISTWIDTH] IN BLOOD BY AUTOMATED COUNT: 14.3 % (ref 12.3–15.4)
GFR SERPLBLD CREATININE-BSD FMLA CKD-EPI: 111 ML/MIN/1.73
GFR SERPLBLD CREATININE-BSD FMLA CKD-EPI: 128 ML/MIN/1.73
GLUCOSE SERPL-MCNC: 87 MG/DL (ref 65–99)
HCT VFR BLD AUTO: 42.7 % (ref 34–46.6)
HGB BLD-MCNC: 13.9 G/DL (ref 11.1–15.9)
INR PPP: 1 (ref 0.8–1.2)
MCH RBC QN AUTO: 28.5 PG (ref 26.6–33)
MCHC RBC AUTO-ENTMCNC: 32.6 G/DL (ref 31.5–35.7)
MCV RBC AUTO: 88 FL (ref 79–97)
PLATELET # BLD AUTO: 288 X10E3/UL (ref 150–379)
POTASSIUM SERPL-SCNC: 4.2 MMOL/L (ref 3.5–5.2)
PROTHROMBIN TIME: 10.2 SEC (ref 9.1–12)
RBC # BLD AUTO: 4.88 X10E6/UL (ref 3.77–5.28)
SODIUM SERPL-SCNC: 144 MMOL/L (ref 134–144)
WBC # BLD AUTO: 8.1 X10E3/UL (ref 3.4–10.8)

## 2018-05-16 NOTE — PROGRESS NOTES
She has lost 18 pounds and is doing great. Sent MC message to patient and told her to keep up the good work.

## 2018-10-26 ENCOUNTER — TELEPHONE (OUTPATIENT)
Dept: FAMILY MEDICINE CLINIC | Age: 48
End: 2018-10-26

## 2018-10-26 DIAGNOSIS — K76.0 FATTY LIVER: ICD-10-CM

## 2018-10-26 DIAGNOSIS — E78.2 MIXED HYPERLIPIDEMIA: ICD-10-CM

## 2018-10-26 DIAGNOSIS — E78.00 HYPERCHOLESTEROLEMIA: ICD-10-CM

## 2018-10-26 DIAGNOSIS — R73.03 PREDIABETES: ICD-10-CM

## 2018-10-26 DIAGNOSIS — E55.9 VITAMIN D DEFICIENCY: ICD-10-CM

## 2018-10-26 NOTE — TELEPHONE ENCOUNTER
----- Message from Jade Pointer sent at 10/26/2018 10:42 AM EDT -----  Regarding: Danica Garrett/ telephone  Pt would need lab work order before her appt on Friday, November 9, 2018 09:00 AM   Pt's contact [phone].

## 2018-10-29 NOTE — TELEPHONE ENCOUNTER
Informed pt that orders placed asked her not to get them done any sooner than a week before her appt.

## 2018-11-03 LAB
25(OH)D3+25(OH)D2 SERPL-MCNC: 35.2 NG/ML (ref 30–100)
ALBUMIN SERPL-MCNC: 4.7 G/DL (ref 3.5–5.5)
ALBUMIN/GLOB SERPL: 1.9 {RATIO} (ref 1.2–2.2)
ALP SERPL-CCNC: 42 IU/L (ref 39–117)
ALT SERPL-CCNC: 11 IU/L (ref 0–32)
AST SERPL-CCNC: 17 IU/L (ref 0–40)
BILIRUB SERPL-MCNC: 0.5 MG/DL (ref 0–1.2)
BUN SERPL-MCNC: 18 MG/DL (ref 6–24)
BUN/CREAT SERPL: 21 (ref 9–23)
CALCIUM SERPL-MCNC: 10 MG/DL (ref 8.7–10.2)
CHLORIDE SERPL-SCNC: 100 MMOL/L (ref 96–106)
CHOLEST SERPL-MCNC: 206 MG/DL (ref 100–199)
CO2 SERPL-SCNC: 23 MMOL/L (ref 20–29)
CREAT SERPL-MCNC: 0.84 MG/DL (ref 0.57–1)
EST. AVERAGE GLUCOSE BLD GHB EST-MCNC: 103 MG/DL
GLOBULIN SER CALC-MCNC: 2.5 G/DL (ref 1.5–4.5)
GLUCOSE SERPL-MCNC: 97 MG/DL (ref 65–99)
HBA1C MFR BLD: 5.2 % (ref 4.8–5.6)
HDLC SERPL-MCNC: 62 MG/DL
INTERPRETATION, 910389: NORMAL
LDLC SERPL CALC-MCNC: 117 MG/DL (ref 0–99)
POTASSIUM SERPL-SCNC: 4.1 MMOL/L (ref 3.5–5.2)
PROT SERPL-MCNC: 7.2 G/DL (ref 6–8.5)
SODIUM SERPL-SCNC: 140 MMOL/L (ref 134–144)
TRIGL SERPL-MCNC: 137 MG/DL (ref 0–149)
TSH SERPL DL<=0.005 MIU/L-ACNC: 3.06 UIU/ML (ref 0.45–4.5)
VLDLC SERPL CALC-MCNC: 27 MG/DL (ref 5–40)

## 2018-11-09 ENCOUNTER — OFFICE VISIT (OUTPATIENT)
Dept: FAMILY MEDICINE CLINIC | Age: 48
End: 2018-11-09

## 2018-11-09 VITALS
WEIGHT: 212 LBS | DIASTOLIC BLOOD PRESSURE: 80 MMHG | HEIGHT: 65 IN | SYSTOLIC BLOOD PRESSURE: 116 MMHG | TEMPERATURE: 97.8 F | BODY MASS INDEX: 35.32 KG/M2 | OXYGEN SATURATION: 98 % | RESPIRATION RATE: 18 BRPM | HEART RATE: 87 BPM

## 2018-11-09 DIAGNOSIS — Z00.00 ROUTINE GENERAL MEDICAL EXAMINATION AT A HEALTH CARE FACILITY: Primary | ICD-10-CM

## 2018-11-09 DIAGNOSIS — E78.2 MIXED HYPERLIPIDEMIA: ICD-10-CM

## 2018-11-09 DIAGNOSIS — R73.03 PREDIABETES: ICD-10-CM

## 2018-11-09 DIAGNOSIS — K76.0 FATTY LIVER: ICD-10-CM

## 2018-11-09 RX ORDER — ZINC GLUCONATE 10 MG
LOZENGE ORAL DAILY
COMMUNITY
End: 2020-11-11 | Stop reason: ALTCHOICE

## 2018-11-09 NOTE — PROGRESS NOTES
HISTORY OF PRESENT ILLNESS  
HPI Annual CPE and follow up prediabetes, hyperlipidemia. Had fasting labs done 11-2-18, here to review. She is very pleased w/ her outcomes since starting on the Keto diet and getting in more walking steps using her Fit Bit. Her wt is down a total of about 35 lbs and her HgbA1c has finally come down to normal at 5.2. She feels great. No complaints or concerns at this time. REVIEW OF SYMPTOMS  
  Review of Systems Constitutional: Negative. HENT: Negative. Eyes: Negative. Respiratory: Negative. Cardiovascular: Negative. Gastrointestinal: Negative. Genitourinary: Negative. Musculoskeletal: Negative. Skin: Negative. Neurological: Negative. Psychiatric/Behavioral: Negative.   
 
 
  
 PROBLEM LIST/MEDICAL HISTORY  
  
Problem List  Date Reviewed: 11/9/2018 Codes Class Noted Severe obesity (BMI 35.0-39. 9) with comorbidity ([facility] [address] ICD-10-CM: E66.01 
ICD-9-CM: 278.01  5/9/2018 Prediabetes ICD-10-CM: R73.03 
ICD-9-CM: 790.29  4/30/2018 Overview Addendum 4/30/2018  2:19 PM by Rosa Plunkett MD  
   Mixed hyperlipidemia ICD-10-CM: E78.2 ICD-9-CM: 272.2  4/30/2018 Fatty liver ICD-10-CM: K76.0 ICD-9-CM: 571.8  4/30/2018 Overview Signed 4/30/2018  2:23 PM by Rosa Plunkett MD  
  Hepatology: Dr Hollie Herrera [id number] H/O arthroscopic knee surgery ICD-10-CM: Z98.890 ICD-9-CM: V45.89  2/9/2018 Chronic cough ICD-10-CM: R05 ICD-9-CM: 786.2  11/3/2017 Overview Addendum 11/3/2017 12:00 PM by Rosa Plunkett MD  
  Evaluation 2014; Dr Ladonna Boast & Dr Simms Speaker (same group), Pulmonary evaluation summer 2017: CXR and PFT's reportedly normal: dx heartburn and allergies; referred to Allergist (Dr Saint Christmas) Vitamin D deficiency ICD-10-CM: E55.9 ICD-9-CM: 268.9  11/3/2017 Overview Signed 11/3/2017 12:18 PM by Rosa Plunkett MD  
  2014 Plantar fasciitis, bilateral ICD-10-CM: M72.2 ICD-9-CM: 728.71  1/3/2014 Obesity ICD-10-CM: E66.9 ICD-9-CM: 278.00  Unknown Hypercholesterolemia ICD-10-CM: E78.00 ICD-9-CM: 272.0  2011 Overview Signed 2011 10:20 AM by Benny Mejia MD  
   History of gestational diabetes mellitus, diet controlled ICD-10-CM: U57.91 
ICD-9-CM: V12.21  2011 Overview Addendum 2011  5:16 PM by Benny Mejia MD  
  2003, 2007 AR (allergic rhinitis) ICD-10-CM: J30.9 ICD-9-CM: 477.9  2011 Overview Signed 11/3/2017 11:48 AM by Benny Mejia MD  
  Blood testing in the s: mold: Re-evaluation Dr Bianca Shaikh at [place] [address] 10-11-17: skin testing: + trees, oaks, ragweed, grass, weeds, dust mites, mold GERD (gastroesophageal reflux disease) ICD-10-CM: K21.9 ICD-9-CM: 530.81  2011 LBP (low back pain) ICD-10-CM: M54.5 ICD-9-CM: 724.2  2011  
   
  
 
 
  PAST SURGICAL HISTORY  
   
Past Surgical History:  
Procedure Laterality Date  ELECTROCARDIOGRAM REPORT    
 normal  
 HX ACL RECONSTRUCTION  1997  
 right  HX BREAST BIOPSY  2002  
 benign, left breast  
 HX  SECTION  2003, 2007  HX CYST REMOVAL  1993  
 fallopian tube  HX LIPOMA RESECTION  2005  
 left arm  HX TONSIL AND ADENOIDECTOMY  8yo  HX TYMPANOSTOMY    
 multiple in her childhood up until T&A age 10 yo  VA BIOPSY OF BREAST, NEEDLE CORE  10/15/2015 Yolanda MEDICATIONS  
  
Current Outpatient Medications Medication Sig  
 magnesium 250 mg tab Take  by mouth.  triamcinolone acetonide (KENALOG) 0.1 % topical cream Apply  to affected area two (2) times daily as needed.  cholecalciferol, VITAMIN D3, (VITAMIN D3) 5,000 unit tab tablet Take  by mouth daily.   
 fluticasone (FLONASE) 50 mcg/actuation nasal spray 2 Sprays by Both Nostrils route daily. (Patient taking differently: 2 Sprays by Both Nostrils route daily. Since 10-11-17 per Allergist)  KRILL OIL PO Take  by mouth daily.  multivitamin with iron-mineral 0.8 mg cmpk Take  by mouth. Multivitamin-Mineral Oil pack, Lemon Peel [employer] concentrate oil No current facility-administered medications for this visit. ALLERGIES No Known Allergies SOCIAL HISTORY  
   
Social History Socioeconomic History  Marital status:  Spouse name: Not on file  Number of children: 2  
 Years of education: Not on file  Highest education level: Not on file Social Needs  Financial resource strain: Not on file  Food insecurity - worry: Not on file  Food insecurity - inability: Not on file  Transportation needs - medical: Not on file  Transportation needs - non-medical: Not on file Occupational History  Occupation:  Tobacco Use  Smoking status: Never Smoker  Smokeless tobacco: Never Used Substance and Sexual Activity  Alcohol use: Yes Comment: Rare, socially, cocktail or wine  Drug use: No  
 Sexual activity: Yes  
  Partners: Male Birth control/protection: Condom Other Topics Concern [address] Service Not Asked  Blood Transfusions Not Asked  Caffeine Concern No  
  Comment: 1 mug of coffee a day; tea a few times a week  Occupational Exposure Not Asked Orquidea Bhatti Hazards Not Asked  Sleep Concern Not Asked  Stress Concern Not Asked  Weight Concern Yes Comment: happy her wt is coming down, down from 240's to 212 since starting Keto Diet 2-2018  Special Diet Yes Comment: Since 2-2018: Keto Diet  Back Care Not Asked  Exercise No  
  Comment: nothing regular just using her Fit Bit and increasing her steps  Bike Helmet Not Asked  Seat Belt Not Asked  Self-Exams Not Asked Social History Narrative  [place]; 1 son, 1 daughter  
 
  
 IMMUNIZATIONS Immunization History Administered Date(s) Administered  Tdap 11/10/2015 FAMILY HISTORY Family History Problem Relation Age of Onset  High Cholesterol Mother   
     on medication  High Cholesterol Father   
     living  Heart Disease Father 71  
     stent place  Breast Cancer Maternal Grandmother 61  Arthritis-osteo Maternal Grandmother  Diabetes Maternal Grandmother  Osteoporosis Maternal Grandmother  Cancer Maternal Grandmother 80 Liver  Diabetes Maternal Grandfather  Heart Disease Maternal Grandfather  High Cholesterol Maternal Grandfather  Stroke Maternal Grandfather 79  
 Diabetes Paternal Grandfather  Glaucoma Paternal Grandfather  Diabetes Paternal Grandmother  Obesity Brother  Allergic Rhinitis Son   
     skin testing, multiple allergies  Breast Cancer Maternal Aunt Senia Zazueta Visit Vitals /80 (BP 1 Location: Left arm, BP Patient Position: Sitting) Pulse 87 Temp 97.8 °F (36.6 °C) (Oral) Resp 18 Ht 5' 4.5\" (1.638 m) Wt 212 lb (96.2 kg) LMP 10/26/2018 SpO2 98% BMI 35.83 kg/m² PHYSICAL EXAMINATION  
  Physical Exam  
Constitutional: She is oriented to person, place, and time and well-developed, well-nourished, and in no distress. HENT:  
Right Ear: Tympanic membrane normal.  
Left Ear: Tympanic membrane normal.  
Nose: Nose normal.  
Mouth/Throat: Oropharynx is clear and moist. No oropharyngeal exudate. Eyes: Conjunctivae and EOM are normal. Pupils are equal, round, and reactive to light. Neck: Neck supple. No thyromegaly present. Cardiovascular: Normal rate, regular rhythm and normal heart sounds. No murmur heard. Pulmonary/Chest: Effort normal and breath sounds normal.  
Abdominal: Soft. There is no tenderness. Musculoskeletal: Normal range of motion. She exhibits no edema or tenderness. Lymphadenopathy:  
  She has no cervical adenopathy. Neurological: She is alert and oriented to person, place, and time. Gait normal.  
Skin: Skin is warm and dry. Psychiatric: Affect normal.  
Vitals reviewed. 
 
 
  
 DIAGNOSTIC DATA  
 
  
 LABORATORY DATA  
   
Lab Results Component Value Date/Time WBC 8.1 05/14/2018 02:04 PM  
 HGB 13.9 05/14/2018 02:04 PM  
 HCT 42.7 05/14/2018 02:04 PM  
 PLATELET 933 29/72/6594 02:04 PM  
 MCV 88 05/14/2018 02:04 PM  
 
Lab Results Component Value Date/Time Cholesterol, total 206 (H) 11/02/2018 08:43 AM  
 HDL Cholesterol 62 11/02/2018 08:43 AM  
 LDL, calculated 117 (H) 11/02/2018 08:43 AM  
 Triglyceride 137 11/02/2018 08:43 AM  
 
Lab Results Component Value Date/Time TSH 3.060 11/02/2018 08:43 AM  
  
Lab Results Component Value Date/Time Sodium 140 11/02/2018 08:43 AM  
 Potassium 4.1 11/02/2018 08:43 AM  
 Chloride 100 11/02/2018 08:43 AM  
 CO2 23 11/02/2018 08:43 AM  
 Glucose 97 11/02/2018 08:43 AM  
 BUN 18 11/02/2018 08:43 AM  
 Creatinine 0.84 11/02/2018 08:43 AM  
 BUN/Creatinine ratio 21 11/02/2018 08:43 AM  
 GFR est AA 96 11/02/2018 08:43 AM  
 GFR est non-AA 84 11/02/2018 08:43 AM  
 Calcium 10.0 11/02/2018 08:43 AM  
 Bilirubin, total 0.5 11/02/2018 08:43 AM  
 ALT (SGPT) 11 11/02/2018 08:43 AM  
 AST (SGOT) 17 11/02/2018 08:43 AM  
 Alk. phosphatase 42 11/02/2018 08:43 AM  
 Protein, total 7.2 11/02/2018 08:43 AM  
 Albumin 4.7 11/02/2018 08:43 AM  
 A-G Ratio 1.9 11/02/2018 08:43 AM  
  
Lab Results Component Value Date/Time Hemoglobin A1c 5.2 11/02/2018 08:43 AM  
   
 
 ASSESSMENT & PLAN  
 
  ICD-10-CM ICD-9-CM 1. Routine general medical examination at a health care facility Z00.00 V70.0 2. Mixed hyperlipidemia E78.2 272.2 3. Prediabetes R73.03 790.29   
4. Fatty liver K76.0 571.8 Fasting lab results from 11-2-18 and schedule of future lab studies reviewed with patient Cardiovascular risk and specific lipid/LDL/BS/HgBA1c goals reviewed Reviewed diet, nutrition, exercise, weight management, BMI/goals, age/risk based screening recommendations, health maintenance & prevention counseling. She is very pleased w/ her outcomes since starting on the Keto diet and getting in more walking steps using her Fit Bit. Her wt is down a total of about 35 lbs and her HgbA1c has finally come down to normal at 5.2. Her liver enzymes have corrected to totally normal. She feels great. Sees gyn annually for well woman visits, gyn exams, mammogram screens, all reportedly normal about one month ago. Since she is doing so well w/ everything now, she would like to defer next follow up to one year for fasting CPE.  RTC sooner prn

## 2018-11-09 NOTE — PROGRESS NOTES
Chief Complaint Patient presents with  Complete Physical  
  did blood work last week - has gyn for paps 1. Have you been to the ER, urgent care clinic since your last visit? Hospitalized since your last visit? No 
 
2. Have you seen or consulted any other health care providers outside of the [address] [place] [address] since your last visit? Include any pap smears or colon screening.  No

## 2018-11-09 NOTE — PATIENT INSTRUCTIONS

## 2018-11-16 ENCOUNTER — OFFICE VISIT (OUTPATIENT)
Dept: FAMILY MEDICINE CLINIC | Age: 48
End: 2018-11-16

## 2018-11-16 VITALS
BODY MASS INDEX: 35.16 KG/M2 | SYSTOLIC BLOOD PRESSURE: 118 MMHG | HEART RATE: 88 BPM | DIASTOLIC BLOOD PRESSURE: 76 MMHG | HEIGHT: 65 IN | WEIGHT: 211 LBS | RESPIRATION RATE: 16 BRPM | OXYGEN SATURATION: 97 % | TEMPERATURE: 97.6 F

## 2018-11-16 DIAGNOSIS — N30.90 BLADDER INFECTION: Primary | ICD-10-CM

## 2018-11-16 LAB
BILIRUB UR QL STRIP: NEGATIVE
GLUCOSE UR-MCNC: NEGATIVE MG/DL
KETONES P FAST UR STRIP-MCNC: NEGATIVE MG/DL
PH UR STRIP: 6 [PH] (ref 4.6–8)
PROT UR QL STRIP: NORMAL
SP GR UR STRIP: 1.01 (ref 1–1.03)
UA UROBILINOGEN AMB POC: NORMAL (ref 0.2–1)
URINALYSIS CLARITY POC: NORMAL
URINALYSIS COLOR POC: YELLOW
URINE BLOOD POC: NORMAL
URINE LEUKOCYTES POC: NORMAL
URINE NITRITES POC: NEGATIVE

## 2018-11-16 RX ORDER — TRIAMCINOLONE ACETONIDE 1 MG/G
CREAM TOPICAL
COMMUNITY
End: 2018-11-16 | Stop reason: SDUPTHER

## 2018-11-16 RX ORDER — BETAMETHASONE DIPROPIONATE 0.5 MG/G
CREAM TOPICAL
COMMUNITY
End: 2019-11-11

## 2018-11-16 RX ORDER — CIPROFLOXACIN 250 MG/1
250 TABLET, FILM COATED ORAL EVERY 12 HOURS
Qty: 14 TAB | Refills: 0 | Status: SHIPPED | OUTPATIENT
Start: 2018-11-16 | End: 2018-11-26

## 2018-11-16 NOTE — PROGRESS NOTES
HISTORY OF PRESENT ILLNESS  Estevan Bello is a 57 y.o. female. HPI: Patient complaints of urinary urgency, frequency and dysuria x 1 day. She is sexually active but denies abnormal vaginal discharge. This morning she experienced a sharp pain in left side of her abdomen but now it is only mild dull pain. She also had a small bump, on he right albiayesterday, but  She nol onger has any lesion or bump today.   Past Medical History:   Diagnosis Date    AR (allergic rhinitis) 2011    Blood testing in the : mold: Re-evaluation Dr May Galloway at [place] [address] 10-11-17: skin testing: + trees, oaks, ragweed, grass, weeds, dust mites, mold    Chronic cough 11/3/2017    Dr Dago Yost, Pulmonary evaluation summer 2017: CXR and PFT's reportedly normal: dx heartburn and allergies    Cough variant asthma 4/10/2014    2014    Fatty liver 2018    Hepatology: Dr Amy Powell     GDM (gestational diabetes mellitus)      GERD (gastroesophageal reflux disease) 2011    Hx of mammogram 10/10/2016    [facility]-Normal Interval follow up    Hypercholesterolemia      LBP (low back pain) 2011    Mixed hyperlipidemia 2018    Obesity     Plantar fasciitis, bilateral 1/3/2014    Prediabetes 2018    Vitamin D deficiency 11/3/2017    2014     Past Surgical History:   Procedure Laterality Date    ELECTROCARDIOGRAM REPORT      normal    HX ACL RECONSTRUCTION  1997    right    HX BREAST BIOPSY  2002    benign, left breast    HX  SECTION  2003, 2007    HX CYST REMOVAL  1993    fallopian tube    HX LIPOMA RESECTION  2005    left arm    HX TONSIL AND ADENOIDECTOMY  9yo    HX TYMPANOSTOMY      multiple in her childhood up until T&A age 12 yo    [place] BIOPSY OF BREAST, NEEDLE CORE  10/15/2015    [facility]anda   No Known Allergies    Current Outpatient Medications:     multivitamin,tx-iron-minerals (COMPLETE MULTIVITAMIN) tab, multivitamin tablet, Disp: , Rfl:     betamethasone dipropionate (DIPROSONE) 0.05 % topical cream, betamethasone dipropionate 0.05 % topical cream, Disp: , Rfl:     ciprofloxacin HCl (CIPRO) 250 mg tablet, Take 1 Tab by mouth every twelve (12) hours for 10 days. , Disp: 14 Tab, Rfl: 0    magnesium 250 mg tab, Take  by mouth., Disp: , Rfl:     triamcinolone acetonide (KENALOG) 0.1 % topical cream, Apply  to affected area two (2) times daily as needed. , Disp: , Rfl:     cholecalciferol, VITAMIN D3, (VITAMIN D3) 5,000 unit tab tablet, Take  by mouth daily. , Disp: , Rfl:     fluticasone (FLONASE) 50 mcg/actuation nasal spray, 2 Sprays by Both Nostrils route daily. (Patient taking differently: 2 Sprays by Both Nostrils route daily. Since 10-11-17 per Allergist), Disp: 1 Bottle, Rfl: 0    KRILL OIL PO, Take  by mouth daily. , Disp: , Rfl:     multivitamin with iron-mineral 0.8 mg cmpk, Take  by mouth. Multivitamin-Mineral Oil pack, [employer] concentrate oil, Disp: , Rfl:   Review of Systems   Constitutional: Negative. Respiratory: Negative. Cardiovascular: Negative. Gastrointestinal: Negative. Genitourinary: Positive for dysuria, frequency and urgency. Physical Exam   Constitutional: No distress. HENT:   Mouth/Throat: Oropharynx is clear and moist.   Neck: Normal range of motion. Neck supple. Cardiovascular: Normal rate and regular rhythm. No murmur heard. Pulmonary/Chest: Effort normal and breath sounds normal.   Abdominal: Soft. Bowel sounds are normal. She exhibits no distension. There is no tenderness. There is no guarding. Genitourinary: No vaginal discharge found. Genitourinary Comments: UA is positive for infection and blood  Right labia is normal no bump or lesion noted   Nursing note and vitals reviewed. ASSESSMENT and PLAN  Diagnoses and all orders for this visit:    1.  Bladder infection  -     AMB POC URINALYSIS DIP STICK AUTO W/O MICRO    - ciprofloxacin HCl (CIPRO) 250 mg tablet; Take 1 Tab by mouth every twelve (12) hours for 10 days.   Pt was given an after visit summary which includes diagnosis, current medicines and vital and voiced understanding of treatment plan

## 2018-11-16 NOTE — PROGRESS NOTES
Chief Complaint   Patient presents with    Abdominal Pain    Vaginal Pain     symptoms began 11/16/18. cloudiness in urine with stream of urine. pimple like , boil area on labia right     1. Have you been to the ER, urgent care clinic since your last visit? Hospitalized since your last visit? No    2. Have you seen or consulted any other health care providers outside of the [address] [place] [address] since your last visit? Include any pap smears or colon screening.  No

## 2019-10-16 ENCOUNTER — TELEPHONE (OUTPATIENT)
Dept: FAMILY MEDICINE CLINIC | Age: 49
End: 2019-10-16

## 2019-10-16 DIAGNOSIS — M72.2 PLANTAR FASCIITIS OF LEFT FOOT: Primary | ICD-10-CM

## 2019-10-16 NOTE — TELEPHONE ENCOUNTER
I called [phone] and spoke to Brayn. She states that they can see a pt for 30 days without a doctor order, after that they need one. She said that the pt had seen Dr Dianne Prieto and that is why they are reaching out to us for the order to treat L foot plantar fascitis and dry needling. Advised that we didn't see pt for that and I will need to check with Dr Dianne Prieto. I will fax order if she OK's or I will contact pt for an appt.

## 2019-10-16 NOTE — TELEPHONE ENCOUNTER
----- Message from Sunday Marty sent at 10/15/2019  5:53 PM EDT -----  Regarding: Dr. Jenelle Falcon   Referral    Caller (first and last name if not the patient or from practice):      Caller's relationship to patient (if not from a practice):      Name of caller (if calling from a practice):      Name of practice: [facility]      Specialist's title, first, and last name:TBA       Office Phone Number: [phone]      Fax number:  [phone]    Date and time of appointment: Pt is has completed an appointment with 14th of October and October 17th       Reason for appointment: Plantar fascia on the L heel with dry needling.     Details to clarify the request: If you have any questions please contact Pt @[phone] Yuliet Oliveira

## 2019-10-17 ENCOUNTER — TELEPHONE (OUTPATIENT)
Dept: FAMILY MEDICINE CLINIC | Age: 49
End: 2019-10-17

## 2019-10-17 DIAGNOSIS — R73.03 PREDIABETES: ICD-10-CM

## 2019-10-17 DIAGNOSIS — E78.2 MIXED HYPERLIPIDEMIA: ICD-10-CM

## 2019-10-17 DIAGNOSIS — K76.0 FATTY LIVER: ICD-10-CM

## 2019-10-17 DIAGNOSIS — Z00.00 ROUTINE GENERAL MEDICAL EXAMINATION AT A HEALTH CARE FACILITY: Primary | ICD-10-CM

## 2019-10-17 NOTE — TELEPHONE ENCOUNTER
Ok thanks. Looks like Dr. Latesha Danielson diagnosed this about 6 yrs ago. I approved order.   May print and fax

## 2019-10-17 NOTE — TELEPHONE ENCOUNTER
Called pt to let her know that I had just faxed na order to [facility]. Pt states that she is scheduled for a CPE she would like to get labs done prior to her appt. Pt will come in 11/9 for labs.   Her appt is 11/13

## 2019-10-17 NOTE — TELEPHONE ENCOUNTER
----- Message from Asael Haji sent at 10/17/2019 12:17 PM EDT -----  Regarding: Dr. Alexsandra Yepez telephone   General Message/Vendor Calls    Caller's first and last name:      Reason for call:  Requesting a call back for an order for treatment for dry needling.  Also would like to request blood work a week before 11/12/19 appt    Callback required yes/no and why:  Yes     Best contact number(s):  [phone]    Details to clarify the request:    Would like to further discuss   Asael Haji

## 2019-11-08 DIAGNOSIS — E78.2 MIXED HYPERLIPIDEMIA: ICD-10-CM

## 2019-11-08 DIAGNOSIS — Z00.00 ROUTINE GENERAL MEDICAL EXAMINATION AT A HEALTH CARE FACILITY: ICD-10-CM

## 2019-11-08 DIAGNOSIS — R73.03 PREDIABETES: ICD-10-CM

## 2019-11-08 DIAGNOSIS — K76.0 FATTY LIVER: ICD-10-CM

## 2019-11-09 LAB
ALBUMIN SERPL-MCNC: 4.6 G/DL (ref 3.5–5.5)
ALBUMIN/GLOB SERPL: 1.9 {RATIO} (ref 1.2–2.2)
ALP SERPL-CCNC: 45 IU/L (ref 39–117)
ALT SERPL-CCNC: 12 IU/L (ref 0–32)
AST SERPL-CCNC: 14 IU/L (ref 0–40)
BILIRUB SERPL-MCNC: 0.4 MG/DL (ref 0–1.2)
BUN SERPL-MCNC: 22 MG/DL (ref 6–24)
BUN/CREAT SERPL: 31 (ref 9–23)
CALCIUM SERPL-MCNC: 10 MG/DL (ref 8.7–10.2)
CHLORIDE SERPL-SCNC: 101 MMOL/L (ref 96–106)
CHOLEST SERPL-MCNC: 208 MG/DL (ref 100–199)
CO2 SERPL-SCNC: 25 MMOL/L (ref 20–29)
CREAT SERPL-MCNC: 0.71 MG/DL (ref 0.57–1)
ERYTHROCYTE [DISTWIDTH] IN BLOOD BY AUTOMATED COUNT: 13.4 % (ref 12.3–15.4)
EST. AVERAGE GLUCOSE BLD GHB EST-MCNC: 108 MG/DL
GLOBULIN SER CALC-MCNC: 2.4 G/DL (ref 1.5–4.5)
GLUCOSE SERPL-MCNC: 96 MG/DL (ref 65–99)
HBA1C MFR BLD: 5.4 % (ref 4.8–5.6)
HCT VFR BLD AUTO: 41.5 % (ref 34–46.6)
HDLC SERPL-MCNC: 63 MG/DL
HGB BLD-MCNC: 13.9 G/DL (ref 11.1–15.9)
INTERPRETATION, 910389: NORMAL
LDLC SERPL CALC-MCNC: 123 MG/DL (ref 0–99)
MCH RBC QN AUTO: 28.9 PG (ref 26.6–33)
MCHC RBC AUTO-ENTMCNC: 33.5 G/DL (ref 31.5–35.7)
MCV RBC AUTO: 86 FL (ref 79–97)
PLATELET # BLD AUTO: 234 X10E3/UL (ref 150–450)
POTASSIUM SERPL-SCNC: 4.4 MMOL/L (ref 3.5–5.2)
PROT SERPL-MCNC: 7 G/DL (ref 6–8.5)
RBC # BLD AUTO: 4.81 X10E6/UL (ref 3.77–5.28)
SODIUM SERPL-SCNC: 141 MMOL/L (ref 134–144)
TRIGL SERPL-MCNC: 112 MG/DL (ref 0–149)
TSH SERPL DL<=0.005 MIU/L-ACNC: 2.1 UIU/ML (ref 0.45–4.5)
VLDLC SERPL CALC-MCNC: 22 MG/DL (ref 5–40)
WBC # BLD AUTO: 5.9 X10E3/UL (ref 3.4–10.8)

## 2019-11-11 ENCOUNTER — OFFICE VISIT (OUTPATIENT)
Dept: FAMILY MEDICINE CLINIC | Age: 49
End: 2019-11-11

## 2019-11-11 VITALS
BODY MASS INDEX: 36.65 KG/M2 | HEIGHT: 65 IN | OXYGEN SATURATION: 98 % | SYSTOLIC BLOOD PRESSURE: 124 MMHG | WEIGHT: 220 LBS | HEART RATE: 78 BPM | RESPIRATION RATE: 16 BRPM | TEMPERATURE: 97.9 F | DIASTOLIC BLOOD PRESSURE: 82 MMHG

## 2019-11-11 DIAGNOSIS — R73.03 PREDIABETES: ICD-10-CM

## 2019-11-11 DIAGNOSIS — E78.00 HYPERCHOLESTEROLEMIA: ICD-10-CM

## 2019-11-11 DIAGNOSIS — Z00.00 ROUTINE GENERAL MEDICAL EXAMINATION AT A HEALTH CARE FACILITY: Primary | ICD-10-CM

## 2019-11-11 DIAGNOSIS — K76.0 FATTY LIVER: ICD-10-CM

## 2019-11-11 NOTE — PATIENT INSTRUCTIONS
Well Visit, Ages 25 to 48: Care Instructions  Your Care Instructions    Physical exams can help you stay healthy. Your doctor has checked your overall health and may have suggested ways to take good care of yourself. He or she also may have recommended tests. At home, you can help prevent illness with healthy eating, regular exercise, and other steps. Follow-up care is a key part of your treatment and safety. Be sure to make and go to all appointments, and call your doctor if you are having problems. It's also a good idea to know your test results and keep a list of the medicines you take. How can you care for yourself at home? · Reach and stay at a healthy weight. This will lower your risk for many problems, such as obesity, diabetes, heart disease, and high blood pressure. · Get at least 30 minutes of physical activity on most days of the week. Walking is a good choice. You also may want to do other activities, such as running, swimming, cycling, or playing tennis or team sports. Discuss any changes in your exercise program with your doctor. · Do not smoke or allow others to smoke around you. If you need help quitting, talk to your doctor about stop-smoking programs and medicines. These can increase your chances of quitting for good. · Talk to your doctor about whether you have any risk factors for sexually transmitted infections (STIs). Having one sex partner (who does not have STIs and does not have sex with anyone else) is a good way to avoid these infections. · Use birth control if you do not want to have children at this time. Talk with your doctor about the choices available and what might be best for you. · Protect your skin from too much sun. When you're outdoors from 10 a.m. to 4 p.m., stay in the shade or cover up with clothing and a hat with a wide brim. Wear sunglasses that block UV rays. Even when it's cloudy, put broad-spectrum sunscreen (SPF 30 or higher) on any exposed skin.   · See a dentist one or two times a year for checkups and to have your teeth cleaned. · Wear a seat belt in the car. Follow your doctor's advice about when to have certain tests. These tests can spot problems early. For everyone  · Cholesterol. Have the fat (cholesterol) in your blood tested after age 21. Your doctor will tell you how often to have this done based on your age, family history, or other things that can increase your risk for heart disease. · Blood pressure. Have your blood pressure checked during a routine doctor visit. Your doctor will tell you how often to check your blood pressure based on your age, your blood pressure results, and other factors. · Vision. Talk with your doctor about how often to have a glaucoma test.  · Diabetes. Ask your doctor whether you should have tests for diabetes. · Colon cancer. Your risk for colorectal cancer gets higher as you get older. Some experts say that adults should start regular screening at age 48 and stop at age 76. Others say to start before age 48 or continue after age 76. Talk with your doctor about your risk and when to start and stop screening. For women  · Breast exam and mammogram. Talk to your doctor about when you should have a clinical breast exam and a mammogram. Medical experts differ on whether and how often women under 50 should have these tests. Your doctor can help you decide what is right for you. · Cervical cancer screening test and pelvic exam. Begin with a Pap test at age 24. The test often is part of a pelvic exam. Starting at age 27, you may choose to have a Pap test, an HPV test, or both tests at the same time (called co-testing). Talk with your doctor about how often to have testing. · Tests for sexually transmitted infections (STIs). Ask whether you should have tests for STIs. You may be at risk if you have sex with more than one person, especially if your partners do not wear condoms.   For men  · Tests for sexually transmitted infections (STIs). Ask whether you should have tests for STIs. You may be at risk if you have sex with more than one person, especially if you do not wear a condom. · Testicular cancer exam. Ask your doctor whether you should check your testicles regularly. · Prostate exam. Talk to your doctor about whether you should have a blood test (called a PSA test) for prostate cancer. Experts differ on whether and when men should have this test. Some experts suggest it if you are older than 39 and are -American or have a father or brother who got prostate cancer when he was younger than 72. When should you call for help? Watch closely for changes in your health, and be sure to contact your doctor if you have any problems or symptoms that concern you. Where can you learn more? Go to http://mahsa-alexus.info/. Enter P072 in the search box to learn more about \"Well Visit, Ages 25 to 48: Care Instructions. \"  Current as of: December 13, 2018  Content Version: 12.2  © 0935-1547 [employer]. Care instructions adapted under license by [employer] (which disclaims liability or warranty for this information). If you have questions about a medical condition or this instruction, always ask your healthcare professional. [place] [address] any warranty or liability for your use of this information.

## 2019-11-11 NOTE — PROGRESS NOTES
Chief Complaint   Patient presents with    Complete Physical     fasting labs done 11-8-19     HISTORY OF PRESENT ILLNESS   HPI  Annual CPE, follow up hypercholesterolemia, prediabetes and review fasting labs done 11-8-19. Still doing Keto Diet or modified variation thereof. Has not been exercising much the past year. Recently started PT w/ dry needling for her plantar fasciitis. Plans to get back on track hopefully once this helps. Belongs to [employer] and can use non wt bearing equipment there for now as well. Wt up a few lbs. Generally feeling well. Perimenopause the past 1-2 yrs. Sees gyn annually for well woman visits/gyn exams and mammogram screens. Mammogram reportedly negative at gyn's last month. Pap  negative and HPV negative, so pap was deferred there this year. No new complaints at this time. REVIEW OF SYMPTOMS   Review of Systems   Constitutional: Negative. HENT: Negative. Eyes: Negative. Respiratory: Negative. Cardiovascular: Negative. Gastrointestinal: Negative. Genitourinary: Negative. Neurological: Negative. Endo/Heme/Allergies: Positive for environmental allergies (uses Flonase prn). Psychiatric/Behavioral: Negative. PROBLEM LIST/MEDICAL HISTORY     Problem List  Date Reviewed: 11/11/2019          Codes Class Noted    Severe obesity (BMI 35.0-39. 9) with comorbidity ([facility] [address] ICD-10-CM: E66.01  ICD-9-CM: 278.01  5/9/2018        Prediabetes ICD-10-CM: R73.03  ICD-9-CM: 790.29  4/30/2018    Overview Addendum 4/30/2018  2:19 PM by Wilbert Calvo MD                  Mixed hyperlipidemia ICD-10-CM: E78.2  ICD-9-CM: 272.2  4/30/2018        Fatty liver ICD-10-CM: K76.0  ICD-9-CM: 571.8  4/30/2018    Overview Signed 4/30/2018  2:23 PM by Wilbert Calvo MD     Hepatology: Dr Emmett Uribe 2-2018             H/O arthroscopic knee surgery ICD-10-CM: V83.518  ICD-9-CM: V45.89  2/9/2018        Chronic cough ICD-10-CM: R05  ICD-9-CM: 786.2  11/3/2017    Overview Addendum 11/3/2017 12:00 PM by Prosper Morales MD     Evaluation ; Dr Kelly Jaeger (same group), Pulmonary evaluation summer 2017: CXR and PFT's reportedly normal: dx heartburn and allergies; referred to Allergist (Dr Rachele Morgan)              Vitamin D deficiency ICD-10-CM: E55.9  ICD-9-CM: 268.9  11/3/2017    Overview Signed 11/3/2017 12:18 PM by Prosper Morales MD                  Plantar fasciitis, bilateral ICD-10-CM: M72.2  ICD-9-CM: 728.71  1/3/2014        Obesity ICD-10-CM: E66.9  ICD-9-CM: 278.00  Unknown        Hypercholesterolemia ICD-10-CM: E78.00  ICD-9-CM: 272.0  2011    Overview Signed 2011 10:20 AM by Prosper Morales MD                  History of gestational diabetes mellitus, diet controlled ICD-10-CM: Z86.32  ICD-9-CM: V12.21  2011    Overview Addendum 2011  5:16 PM by Prosper Morales MD     2003, 2007             AR (allergic rhinitis) ICD-10-CM: J30.9  ICD-9-CM: 477.9  2011    Overview Signed 11/3/2017 11:48 AM by Prosper Morales MD     Blood testing in the : mold: Re-evaluation Dr Rachele Morgan at [place] [address] 10-11-17: skin testing: + trees, oaks, ragweed, grass, weeds, dust mites, mold             GERD (gastroesophageal reflux disease) ICD-10-CM: K21.9  ICD-9-CM: 530.81  2011        LBP (low back pain) ICD-10-CM: M54.5  ICD-9-CM: 724.2  2011                  PAST SURGICAL HISTORY     Past Surgical History:   Procedure Laterality Date    ELECTROCARDIOGRAM REPORT      normal    HX ACL RECONSTRUCTION  1997    right    HX BREAST BIOPSY  2002    benign, left breast    HX  SECTION  2003, 2007    HX CYST REMOVAL  1993    fallopian tube    HX LIPOMA RESECTION  2005    left arm    HX TONSIL AND ADENOIDECTOMY  11yo    HX TYMPANOSTOMY      multiple in her childhood up until T&A age 12 yo    [facility]H [facility], NEEDLE CORE  10/15/2015    [facility]olanda MEDICATIONS     Current Outpatient Medications   Medication Sig    magnesium 250 mg tab Take  by mouth daily.  triamcinolone acetonide (KENALOG) 0.1 % topical cream Apply  to affected area two (2) times daily as needed.  cholecalciferol, VITAMIN D3, (VITAMIN D3) 5,000 unit tab tablet Take  by mouth daily.  fluticasone (FLONASE) 50 mcg/actuation nasal spray 2 Sprays by Both Nostrils route daily. (Patient taking differently: 2 Sprays by Both Nostrils route daily. Since 10-11-17 per Allergist)    KRILL OIL PO Take  by mouth daily.  multivitamin with iron-mineral 0.8 mg cmpk Take  by mouth. Multivitamin-Mineral Oil pack, Lemon [employer] concentrate oil     No current facility-administered medications for this visit.            ALLERGIES   No Known Allergies       SOCIAL HISTORY     Social History     Socioeconomic History    Marital status:      Spouse name: Not on file    Number of children: 2    Years of education: Not on file    Highest education level: Not on file   Occupational History    Occupation:  for [employer]    Smoking status: Never Smoker    Smokeless tobacco: Never Used   Substance and Sexual Activity    Alcohol use: Yes     Comment: Rare, socially, cocktail or wine    Drug use: No    Sexual activity: Yes     Partners: Male     Birth control/protection: Condom   Other Topics Concern    Caffeine Concern No     Comment: 1 mug of coffee a day; tea a few times a week    Special Diet Yes     Comment: Since 2-2018: Keto Diet    Exercise No     Comment: nothing regular just using her Fit Bit and increasing her steps   Social History Narrative    [place] Descent; 1 son, 1 daughter        IMMUNIZATIONS  Immunization History   Administered Date(s) Administered    Tdap 11/10/2015         FAMILY HISTORY     Family History   Problem Relation Age of Onset    High Cholesterol Mother         on medication    High Cholesterol Father         living    Heart Disease Father [address]     Breast Cancer Maternal Grandmother 61    Arthritis-osteo Maternal Grandmother     Diabetes Maternal Grandmother     Osteoporosis Maternal Grandmother     Cancer Maternal Grandmother 82        Liver    Diabetes Maternal Grandfather     Heart Disease Maternal Grandfather     High Cholesterol Maternal Grandfather     Stroke Maternal Grandfather 79    Diabetes Paternal Grandfather     Glaucoma Paternal Grandfather     Diabetes Paternal Grandmother     Obesity Brother     Allergic Rhinitis Son         skin testing, multiple allergies    Breast Cancer Maternal Aunt          VITALS     Visit Vitals  /82 (BP Patient Position: Sitting)   Pulse 78   Temp 97.9 °F (36.6 °C) (Oral)   Resp 16   Ht 5' 4.5\" (1.638 m)   Wt 220 lb (99.8 kg)   LMP 04/17/2019   SpO2 98%   BMI 37.18 kg/m²          PHYSICAL EXAMINATION   Physical Exam   Constitutional: She is oriented to person, place, and time and well-developed, well-nourished, and in no distress. HENT:   Right Ear: Tympanic membrane normal.   Left Ear: Tympanic membrane normal.   Nose: Nose normal.   Mouth/Throat: Oropharynx is clear and moist. No oropharyngeal exudate. Eyes: Pupils are equal, round, and reactive to light. Conjunctivae and EOM are normal.   Neck: Neck supple. No thyromegaly present. Cardiovascular: Normal rate, regular rhythm and normal heart sounds. No murmur heard. Pulmonary/Chest: Effort normal and breath sounds normal. No respiratory distress. Abdominal: Soft. Bowel sounds are normal. She exhibits no distension and no mass. There is no tenderness. Musculoskeletal: She exhibits no edema or tenderness. Lymphadenopathy:     She has no cervical adenopathy. Neurological: She is alert and oriented to person, place, and time. Gait normal.   Skin: Skin is warm. Psychiatric: Mood and affect normal.   Vitals reviewed.           DIAGNOSTIC DATA LABORATORY DATA     Results for orders placed or performed in visit on 11/08/19   HEMOGLOBIN A1C WITH EAG   Result Value Ref Range    Hemoglobin A1c 5.4 4.8 - 5.6 %    Estimated average glucose 108 mg/dL   TSH 3RD GENERATION   Result Value Ref Range    TSH 2.100 0.450 - 0.348 uIU/mL   METABOLIC PANEL, COMPREHENSIVE   Result Value Ref Range    Glucose 96 65 - 99 mg/dL    BUN 22 6 - 24 mg/dL    Creatinine 0.71 0.57 - 1.00 mg/dL    GFR est non- >59 mL/min/1.73    GFR est  >59 mL/min/1.73    BUN/Creatinine ratio 31 (H) 9 - 23    Sodium 141 134 - 144 mmol/L    Potassium 4.4 3.5 - 5.2 mmol/L    Chloride 101 96 - 106 mmol/L    CO2 25 20 - 29 mmol/L    Calcium 10.0 8.7 - 10.2 mg/dL    Protein, total 7.0 6.0 - 8.5 g/dL    Albumin 4.6 3.5 - 5.5 g/dL    GLOBULIN, TOTAL 2.4 1.5 - 4.5 g/dL    A-G Ratio 1.9 1.2 - 2.2    Bilirubin, total 0.4 0.0 - 1.2 mg/dL    Alk. phosphatase 45 39 - 117 IU/L    AST (SGOT) 14 0 - 40 IU/L    ALT (SGPT) 12 0 - 32 IU/L   CBC W/O DIFF   Result Value Ref Range    WBC 5.9 3.4 - 10.8 x10E3/uL    RBC 4.81 3.77 - 5.28 x10E6/uL    HGB 13.9 11.1 - 15.9 g/dL    HCT 41.5 34.0 - 46.6 %    MCV 86 79 - 97 fL    MCH 28.9 26.6 - 33.0 pg    MCHC 33.5 31.5 - 35.7 g/dL    RDW 13.4 12.3 - 15.4 %    PLATELET 363 317 - 647 x10E3/uL   LIPID PANEL   Result Value Ref Range    Cholesterol, total 208 (H) 100 - 199 mg/dL    Triglyceride 112 0 - 149 mg/dL    HDL Cholesterol 63 >39 mg/dL    VLDL, calculated 22 5 - 40 mg/dL    LDL, calculated 123 (H) 0 - 99 mg/dL   CVD REPORT   Result Value Ref Range    INTERPRETATION Note        Lab Results   Component Value Date/Time    Hemoglobin A1c 5.4 11/08/2019 08:42 AM    Hemoglobin A1c 5.2 11/02/2018 08:43 AM    Hemoglobin A1c 5.7 (H) 05/03/2018 08:06 AM    Glucose 96 11/08/2019 08:42 AM    LDL, calculated 123 (H) 11/08/2019 08:42 AM    Creatinine 0.71 11/08/2019 08:42 AM          ASSESSMENT & PLAN       ICD-10-CM ICD-9-CM    1.  Routine general medical examination at a health care facility Z00.00 V70.0    2. Hypercholesterolemia E78.00 272.0    3. Prediabetes R73.03 790.29    4. Fatty liver K76.0 571.8      Fasting lab results from 11-8-19 (copied above) reviewed with patient in office today, copy given  Cardiovascular risk and specific lipid/LDL/BS/HgbA1c goals reviewed  Reviewed diet, nutrition, exercise, weight management, BMI/goals, age/risk based screening recommendations, health maintenance & prevention counseling. Cancer screening USPTFS guidelines reviewed w/ pt today. Discussed benefits/positive/negative outcomes of screening based on age/risk stratification. Informed consent for/against screening based on pt's personal hx/risk factors. Updated in history above and health maintenance.    Sees gyn annually for well woman visits/gyn exams/routine pap screenings and mammogram screens  RTC 1 yr for annual fasting check, follow up sooner prn

## 2019-11-11 NOTE — PROGRESS NOTES
Chief Complaint   Patient presents with    Physical     pt states she had her fasting labs done 3 days ago. \"REVIEWED RECORD IN PREPARATION FOR VISIT AND HAVE OBTAINED THE NECESSARY DOCUMENTATION\"  1. Have you been to the ER, urgent care clinic since your last visit? Hospitalized since your last visit? No    2. Have you seen or consulted any other health care providers outside of the [address] [place] [address] since your last visit? Include any pap smears or colon screening.  No

## 2020-11-11 ENCOUNTER — OFFICE VISIT (OUTPATIENT)
Dept: FAMILY MEDICINE CLINIC | Age: 50
End: 2020-11-11
Payer: COMMERCIAL

## 2020-11-11 VITALS
OXYGEN SATURATION: 98 % | TEMPERATURE: 98.2 F | SYSTOLIC BLOOD PRESSURE: 116 MMHG | HEART RATE: 86 BPM | WEIGHT: 227 LBS | DIASTOLIC BLOOD PRESSURE: 74 MMHG | BODY MASS INDEX: 37.82 KG/M2 | HEIGHT: 65 IN | RESPIRATION RATE: 16 BRPM

## 2020-11-11 DIAGNOSIS — R73.03 PREDIABETES: ICD-10-CM

## 2020-11-11 DIAGNOSIS — E78.2 MIXED HYPERLIPIDEMIA: ICD-10-CM

## 2020-11-11 DIAGNOSIS — Z00.00 ROUTINE GENERAL MEDICAL EXAMINATION AT A HEALTH CARE FACILITY: Primary | ICD-10-CM

## 2020-11-11 PROBLEM — Z78.0 MENOPAUSE: Status: ACTIVE | Noted: 2020-11-11

## 2020-11-11 PROCEDURE — 99396 PREV VISIT EST AGE 40-64: CPT | Performed by: FAMILY MEDICINE

## 2020-11-11 NOTE — PROGRESS NOTES
Chief Complaint   Patient presents with    Complete Physical     1. Have you been to the ER, urgent care clinic since your last visit? Hospitalized since your last visit? No    2. Have you seen or consulted any other health care providers outside of the [address] [place] [address] since your last visit? Include any pap smears or colon screening.  No

## 2020-11-12 NOTE — PROGRESS NOTES
Chief Complaint   Patient presents with    Complete Physical     fasting labs done 11/5/20    Cholesterol Problem    Pre-diabetes       HISTORY OF PRESENT ILLNESS   HPI  Annual CPE, follow up hypercholesterolemia, prediabetes and discuss fasting labs done 11/5/20. Doing Keto Diet since 2018 and got her weight, trigs, and hgbA1c down over the past few years since doing so. She has not been as disciplined since this past spring but not as bad as she used to be in previous years. States now she just does a modified variation of Keto. Weight and Hgba1c are back up some. But in general she has been feeling well and has no complaints at this time. Sees Gyn annually for well woman visits/gyn exams. States her gyn tested her hormones in 5-2020 and she is \"in menopause\". LMP ~ 4-2019. Pap 10-26-20. Mammogram at [facility] , scheduled for  per gyn. REVIEW OF SYMPTOMS   Review of Systems   Constitutional: Negative. HENT: Negative. Eyes: Negative. Respiratory: Negative. Cardiovascular: Negative. Gastrointestinal: Negative. Genitourinary: Negative. Musculoskeletal: Negative. Neurological: Negative. Endo/Heme/Allergies: Negative. Psychiatric/Behavioral: Negative. PROBLEM LIST/MEDICAL HISTORY     Problem List  Date Reviewed: 11/11/2020          Codes Class Noted    Menopause ICD-10-CM: Z78.0  ICD-9-CM: 627.2  11/11/2020    Overview Signed 11/11/2020  9:57 AM by Gilson Silverio MD     LMP [id number]             Severe obesity (BMI 35.0-39. 9) with comorbidity ([facility] [address] ICD-10-CM: E66.01  ICD-9-CM: 278.01  5/9/2018        Prediabetes ICD-10-CM: R73.03  ICD-9-CM: 790.29  4/30/2018    Overview Addendum 4/30/2018  2:19 PM by Gilson Silverio MD                  Mixed hyperlipidemia ICD-10-CM: E78.2  ICD-9-CM: 272.2  4/30/2018        Fatty liver ICD-10-CM: K76.0  ICD-9-CM: 571.8  4/30/2018    Overview Signed 4/30/2018  2:23 PM by Gilson Silverio MD Hepatology: Dr Dominique Clay              H/O arthroscopic knee surgery ICD-10-CM: Z98.890  ICD-9-CM: V45.89  2018        Chronic cough ICD-10-CM: R05  ICD-9-CM: 786.2  11/3/2017    Overview Addendum 11/3/2017 12:00 PM by Wei Camacho MD     Evaluation ; Dr Jacey Caldwell & Dr Andrew Villarreal (same group), Pulmonary evaluation summer 2017: CXR and PFT's reportedly normal: dx heartburn and allergies; referred to Allergist (Dr Jacquelyn Elizabeth)              Vitamin D deficiency ICD-10-CM: E55.9  ICD-9-CM: 268.9  11/3/2017    Overview Signed 11/3/2017 12:18 PM by Wei Camacho MD                  Plantar fasciitis, bilateral ICD-10-CM: M72.2  ICD-9-CM: 728.71  1/3/2014        Obesity ICD-10-CM: E66.9  ICD-9-CM: 278.00  Unknown        Hypercholesterolemia ICD-10-CM: E78.00  ICD-9-CM: 272.0  2011    Overview Signed 2011 10:20 AM by Wei Camacho MD                  History of gestational diabetes mellitus, diet controlled ICD-10-CM: Z86.32  ICD-9-CM: V12.21  2011    Overview Addendum 2011  5:16 PM by Wei Camacho MD     2003, 2007             AR (allergic rhinitis) ICD-10-CM: J30.9  ICD-9-CM: 477.9  2011    Overview Signed 11/3/2017 11:48 AM by Wei Camacho MD     Blood testing in the : mold: Re-evaluation Dr Jacquelyn Elizabeth at [place] [address] 10-11-17: skin testing: + trees, oaks, ragweed, grass, weeds, dust mites, mold             GERD (gastroesophageal reflux disease) ICD-10-CM: K21.9  ICD-9-CM: 530.81  2011        LBP (low back pain) ICD-10-CM: M54.5  ICD-9-CM: 724.2  2011                  PAST SURGICAL HISTORY     Past Surgical History:   Procedure Laterality Date    ELECTROCARDIOGRAM REPORT      normal    HX ACL RECONSTRUCTION  1997    right    HX BREAST BIOPSY  2002    benign, left breast    HX  SECTION  2003, 2007    HX CYST REMOVAL  1993    fallopian tube    HX LIPOMA RESECTION  2005    left arm    HX TONSIL AND ADENOIDECTOMY  9yo    HX TYMPANOSTOMY      multiple in her childhood up until T&A age 10 yo    [place] BIOPSY OF BREAST, NEEDLE CORE  10/15/2015    Yolanda         MEDICATIONS     Current Outpatient Medications   Medication Sig    triamcinolone acetonide (KENALOG) 0.1 % topical cream Apply  to affected area two (2) times daily as needed.  cholecalciferol, VITAMIN D3, (VITAMIN D3) 5,000 unit tab tablet Take  by mouth daily.  fluticasone (FLONASE) 50 mcg/actuation nasal spray 2 Sprays by Both Nostrils route daily. (Patient taking differently: 2 Sprays by Both Nostrils route daily. Since 10-11-17 per Allergist)    KRILL OIL PO Take  by mouth daily.  multivitamin with iron-mineral 0.8 mg cmpk Take  by mouth. Multivitamin-Mineral Oil pack, Lemon Peel & Wild [employer] concentrate oil     No current facility-administered medications for this visit.            ALLERGIES   No Known Allergies       SOCIAL HISTORY     Social History     Socioeconomic History    Marital status:      Spouse name: Not on file    Number of children: 2    Years of education: Not on file    Highest education level: Not on file   Occupational History    Occupation:  [employer]    Smoking status: Never Smoker    Smokeless tobacco: Never Used   Substance and Sexual Activity    Alcohol use: Yes     Comment: Rare, socially, cocktail or wine    Drug use: No    Sexual activity: Yes     Partners: Male     Birth control/protection: Condom   Other Topics Concern    Caffeine Concern No     Comment: 1 mug of coffee a day; tea a few times a week    Special Diet Yes     Comment: Since 2-2018: Keto Diet, but not as strict since spring 2020 and getting more of a \"sweet tooth\" this summer    Exercise No     Comment: nothing regular just using her Fit Bit and increasing her steps   Social History Narrative    [place]oz[place] Descent; 1 son, 1 daughter        [employer]  Immunization History   Administered Date(s) Administered    Tdap 11/10/2015         FAMILY HISTORY     Family History   Problem Relation Age of Onset    High Cholesterol Mother         on medication    High Cholesterol Father         living    Heart Disease Father [address]     Breast Cancer Maternal Grandmother 61    Arthritis-osteo Maternal Grandmother     Diabetes Maternal Grandmother     Osteoporosis Maternal Grandmother     Cancer Maternal Grandmother 82        Liver    Diabetes Maternal Grandfather     Heart Disease Maternal Grandfather     High Cholesterol Maternal Grandfather     Stroke Maternal Grandfather 79    Diabetes Paternal Grandfather     Glaucoma Paternal Grandfather     Diabetes Paternal Grandmother     Obesity Brother     Allergic Rhinitis Son         skin testing, multiple allergies    Breast Cancer Maternal Aunt          VITALS     Visit Vitals  /74 (BP 1 Location: Left arm, BP Patient Position: Sitting)   Pulse 86   Temp 98.2 °F (36.8 °C) (Temporal)   Resp 16   Ht 5' 4.5\" (1.638 m)   Wt 227 lb (103 kg)   LMP 04/01/2019   SpO2 98%   BMI 38.36 kg/m²          PHYSICAL EXAMINATION   Physical Exam  Vitals signs reviewed. Constitutional:       General: She is not in acute distress. HENT:      Right Ear: Tympanic membrane normal.      Left Ear: Tympanic membrane normal.   Eyes:      General: No scleral icterus. Neck:      Musculoskeletal: Neck supple. No muscular tenderness. Thyroid: No thyroid mass, thyromegaly or thyroid tenderness. Vascular: No carotid bruit. Cardiovascular:      Rate and Rhythm: Normal rate and regular rhythm. Heart sounds: Normal heart sounds. No murmur. No gallop. Pulmonary:      Effort: Pulmonary effort is normal.      Breath sounds: Normal breath sounds. Abdominal:      Palpations: Abdomen is soft. Tenderness: There is no abdominal tenderness. Musculoskeletal:         General: No swelling or tenderness.       Right lower leg: No edema. Left lower leg: No edema. Lymphadenopathy:      Cervical: No cervical adenopathy. Skin:     General: Skin is warm and dry. Neurological:      General: No focal deficit present. Mental Status: She is alert and oriented to person, place, and time. Psychiatric:         Mood and Affect: Mood normal.             DIAGNOSTIC DATA         LABORATORY DATA     Results for orders placed or performed in visit on 51/09/76   METABOLIC PANEL, COMPREHENSIVE   Result Value Ref Range    Glucose 95 65 - 99 mg/dL    BUN 18 6 - 24 mg/dL    Creatinine 0.75 0.57 - 1.00 mg/dL    GFR est non-AA 95 >59 mL/min/1.73    GFR est  >59 mL/min/1.73    BUN/Creatinine ratio 24 (H) 9 - 23    Sodium 142 134 - 144 mmol/L    Potassium 4.3 3.5 - 5.2 mmol/L    Chloride 105 96 - 106 mmol/L    CO2 24 20 - 29 mmol/L    Calcium 10.4 (H) 8.7 - 10.2 mg/dL    Protein, total 7.2 6.0 - 8.5 g/dL    Albumin 4.7 3.8 - 4.8 g/dL    GLOBULIN, TOTAL 2.5 1.5 - 4.5 g/dL    A-G Ratio 1.9 1.2 - 2.2    Bilirubin, total 0.4 0.0 - 1.2 mg/dL    Alk.  phosphatase 59 39 - 117 IU/L    AST (SGOT) 14 0 - 40 IU/L    ALT (SGPT) 14 0 - 32 IU/L   CBC W/O DIFF   Result Value Ref Range    WBC 5.7 3.4 - 10.8 x10E3/uL    RBC 5.13 3.77 - 5.28 x10E6/uL    HGB 14.3 11.1 - 15.9 g/dL    HCT 42.9 34.0 - 46.6 %    MCV 84 79 - 97 fL    MCH 27.9 26.6 - 33.0 pg    MCHC 33.3 31.5 - 35.7 g/dL    RDW 13.8 11.7 - 15.4 %    PLATELET 350 045 - 021 x10E3/uL   LIPID PANEL   Result Value Ref Range    Cholesterol, total 225 (H) 100 - 199 mg/dL    Triglyceride 129 0 - 149 mg/dL    HDL Cholesterol 67 >39 mg/dL    VLDL Cholesterol Bebeto 23 5 - 40 mg/dL    LDL Chol Calc (NIH) 135 (H) 0 - 99 mg/dL   HEMOGLOBIN A1C WITH EAG   Result Value Ref Range    Hemoglobin A1c 5.7 (H) 4.8 - 5.6 %    Estimated average glucose 117 mg/dL   TSH 3RD GENERATION   Result Value Ref Range    TSH 2.310 0.450 - 4.500 uIU/mL   CVD REPORT   Result Value Ref Range    INTERPRETATION Note        Lab Results Component Value Date/Time    Hemoglobin A1c 5.7 (H) 11/05/2020 10:06 AM    Hemoglobin A1c 5.4 11/08/2019 08:42 AM    Hemoglobin A1c 5.2 11/02/2018 08:43 AM    Glucose 95 11/05/2020 10:06 AM    LDL, calculated 123 (H) 11/08/2019 08:42 AM    LDL Chol Calc (NIH) 135 (H) 11/05/2020 10:06 AM    Creatinine 0.75 11/05/2020 10:06 AM          ASSESSMENT & PLAN       ICD-10-CM ICD-9-CM    1. Routine general medical examination at a health care facility  Z00.00 V70.0    2. Mixed hyperlipidemia  E78.2 272.2    3. Prediabetes  R73.03 790.29    4. BMI 38.0-38.9,adult  Z68.38 V85.38      Recent fasting lab results from 11/5, cardiovascular risk and specific lipid/LDL/BS/Hgba1c/BMI goals reviewed w/ pt today  Reviewed diet, nutrition, exercise, weight management, BMI/goals. Age/risk based screening recommendations, health maintenance & prevention counseling. Cancer screening USPTFS guidelines reviewed w/ pt today. Discussed benefits/positive/negative outcomes of screening based on age/risk stratification. Informed consent for/against screening based on pt's personal hx/risk factors. Updated in history above and health maintenance. Sees Gyn annually for well woman visits/gyn exams. Pap 10-26-20. Mammogram at [facility] , scheduled for 12- per gyn. Colonoscopy scheduled 11- Dr. Feli Capellan. Return in one year for annual fasting checkup.  Follow up sooner prn

## 2021-11-05 ENCOUNTER — LAB ONLY (OUTPATIENT)
Dept: FAMILY MEDICINE CLINIC | Age: 51
End: 2021-11-05

## 2021-11-05 DIAGNOSIS — E78.2 MIXED HYPERLIPIDEMIA: ICD-10-CM

## 2021-11-05 DIAGNOSIS — R73.03 PREDIABETES: ICD-10-CM

## 2021-11-05 DIAGNOSIS — Z00.00 ROUTINE GENERAL MEDICAL EXAMINATION AT A HEALTH CARE FACILITY: Primary | ICD-10-CM

## 2021-11-05 DIAGNOSIS — K76.0 FATTY LIVER: ICD-10-CM

## 2021-11-06 LAB
ALBUMIN SERPL-MCNC: 4.9 G/DL (ref 3.8–4.8)
ALBUMIN/GLOB SERPL: 1.9 {RATIO} (ref 1.2–2.2)
ALP SERPL-CCNC: 51 IU/L (ref 44–121)
ALT SERPL-CCNC: 16 IU/L (ref 0–32)
AST SERPL-CCNC: 11 IU/L (ref 0–40)
BILIRUB SERPL-MCNC: 0.3 MG/DL (ref 0–1.2)
BUN SERPL-MCNC: 23 MG/DL (ref 6–24)
BUN/CREAT SERPL: 32 (ref 9–23)
CALCIUM SERPL-MCNC: 10.1 MG/DL (ref 8.7–10.2)
CHLORIDE SERPL-SCNC: 103 MMOL/L (ref 96–106)
CHOLEST SERPL-MCNC: 240 MG/DL (ref 100–199)
CO2 SERPL-SCNC: 25 MMOL/L (ref 20–29)
CREAT SERPL-MCNC: 0.72 MG/DL (ref 0.57–1)
ERYTHROCYTE [DISTWIDTH] IN BLOOD BY AUTOMATED COUNT: 14 % (ref 11.7–15.4)
EST. AVERAGE GLUCOSE BLD GHB EST-MCNC: 120 MG/DL
GLOBULIN SER CALC-MCNC: 2.6 G/DL (ref 1.5–4.5)
GLUCOSE SERPL-MCNC: 113 MG/DL (ref 65–99)
HBA1C MFR BLD: 5.8 % (ref 4.8–5.6)
HCT VFR BLD AUTO: 42.7 % (ref 34–46.6)
HDLC SERPL-MCNC: 58 MG/DL
HGB BLD-MCNC: 13.9 G/DL (ref 11.1–15.9)
IMP & REVIEW OF LAB RESULTS: NORMAL
LDLC SERPL CALC-MCNC: 156 MG/DL (ref 0–99)
MCH RBC QN AUTO: 27.6 PG (ref 26.6–33)
MCHC RBC AUTO-ENTMCNC: 32.6 G/DL (ref 31.5–35.7)
MCV RBC AUTO: 85 FL (ref 79–97)
PLATELET # BLD AUTO: 236 X10E3/UL (ref 150–450)
POTASSIUM SERPL-SCNC: 4.1 MMOL/L (ref 3.5–5.2)
PROT SERPL-MCNC: 7.5 G/DL (ref 6–8.5)
RBC # BLD AUTO: 5.03 X10E6/UL (ref 3.77–5.28)
SODIUM SERPL-SCNC: 141 MMOL/L (ref 134–144)
TRIGL SERPL-MCNC: 146 MG/DL (ref 0–149)
TSH SERPL DL<=0.005 MIU/L-ACNC: 2.64 UIU/ML (ref 0.45–4.5)
VLDLC SERPL CALC-MCNC: 26 MG/DL (ref 5–40)
WBC # BLD AUTO: 6.1 X10E3/UL (ref 3.4–10.8)

## 2021-11-06 NOTE — PROGRESS NOTES
Labs pre-ordered for review at upcoming appointment with PCP:    Future Appointments  Date Time Provider Nessa Ball  11/11/2021  2:30 PM Jaylin Pittman MD [facility] BS AMB

## 2021-11-11 ENCOUNTER — OFFICE VISIT (OUTPATIENT)
Dept: FAMILY MEDICINE CLINIC | Age: 51
End: 2021-11-11
Payer: COMMERCIAL

## 2021-11-11 VITALS
RESPIRATION RATE: 16 BRPM | BODY MASS INDEX: 40.12 KG/M2 | DIASTOLIC BLOOD PRESSURE: 74 MMHG | OXYGEN SATURATION: 98 % | TEMPERATURE: 97.6 F | SYSTOLIC BLOOD PRESSURE: 132 MMHG | HEART RATE: 88 BPM | HEIGHT: 64 IN | WEIGHT: 235 LBS

## 2021-11-11 DIAGNOSIS — R73.03 PREDIABETES: ICD-10-CM

## 2021-11-11 DIAGNOSIS — E78.2 MIXED HYPERLIPIDEMIA: ICD-10-CM

## 2021-11-11 DIAGNOSIS — E66.01 OBESITY, CLASS III, BMI 40-49.9 (MORBID OBESITY) (HCC): ICD-10-CM

## 2021-11-11 DIAGNOSIS — Z00.00 ROUTINE GENERAL MEDICAL EXAMINATION AT A HEALTH CARE FACILITY: Primary | ICD-10-CM

## 2021-11-11 PROCEDURE — 99396 PREV VISIT EST AGE 40-64: CPT | Performed by: FAMILY MEDICINE

## 2021-11-11 RX ORDER — ASCORBIC ACID 250 MG
250 TABLET ORAL AS NEEDED
COMMUNITY

## 2021-11-11 RX ORDER — METFORMIN HYDROCHLORIDE 500 MG/1
500 TABLET, EXTENDED RELEASE ORAL 2 TIMES DAILY WITH MEALS
Qty: 60 TABLET | Refills: 1 | Status: SHIPPED | OUTPATIENT
Start: 2021-11-11 | End: 2022-02-08 | Stop reason: SDUPTHER

## 2021-11-11 NOTE — PROGRESS NOTES
Chief Complaint   Patient presents with    Complete Physical     fasting labs done 11-5-21    Cholesterol Problem    Pre-diabetes     1. \"Have you been to the ER, urgent care clinic since your last visit? Hospitalized since your last visit? \" No    2. \"Have you seen or consulted any other health care providers outside of the [address] [place] [address] since your last visit? \" No     3. For patients over 45: Has the patient had a colonoscopy? Yes 2020    If the patient is female:    4. For patients over 40: Has the patient had a mammogram? @ [facility]    5. For patients over 21: Has the patient had a pap smear?  Gyn [facility]

## 2022-01-10 ENCOUNTER — TELEPHONE (OUTPATIENT)
Dept: FAMILY MEDICINE CLINIC | Age: 52
End: 2022-01-10

## 2022-02-01 ENCOUNTER — APPOINTMENT (OUTPATIENT)
Dept: FAMILY MEDICINE CLINIC | Age: 52
End: 2022-02-01

## 2022-02-08 ENCOUNTER — OFFICE VISIT (OUTPATIENT)
Dept: FAMILY MEDICINE CLINIC | Age: 52
End: 2022-02-08
Payer: COMMERCIAL

## 2022-02-08 VITALS
DIASTOLIC BLOOD PRESSURE: 78 MMHG | RESPIRATION RATE: 16 BRPM | BODY MASS INDEX: 41.21 KG/M2 | HEIGHT: 64 IN | OXYGEN SATURATION: 99 % | TEMPERATURE: 97.8 F | HEART RATE: 67 BPM | WEIGHT: 241.4 LBS | SYSTOLIC BLOOD PRESSURE: 122 MMHG

## 2022-02-08 DIAGNOSIS — R73.03 PREDIABETES: Primary | ICD-10-CM

## 2022-02-08 DIAGNOSIS — E66.01 OBESITY, CLASS III, BMI 40-49.9 (MORBID OBESITY) (HCC): ICD-10-CM

## 2022-02-08 DIAGNOSIS — E78.2 MIXED HYPERLIPIDEMIA: ICD-10-CM

## 2022-02-08 PROCEDURE — 99213 OFFICE O/P EST LOW 20 MIN: CPT | Performed by: FAMILY MEDICINE

## 2022-02-08 RX ORDER — METFORMIN HYDROCHLORIDE 500 MG/1
500 TABLET, EXTENDED RELEASE ORAL 2 TIMES DAILY WITH MEALS
Qty: 180 TABLET | Refills: 2 | Status: SHIPPED | OUTPATIENT
Start: 2022-02-08

## 2022-02-08 NOTE — PROGRESS NOTES
Chief Complaint   Patient presents with    Pre-diabetes     Fasting 3 month follow up    Cholesterol Problem    Medication Evaluation     New to Metformin        HISTORY OF PRESENT ILLNESS   HPI  3 month follow up medication check after starting on Metformin at her CPE  for prediabetes and weight mgt. She admits to taking it inconsistently. Initially some loose stools but that is better now. Trying to get in the habit of being more consistent w/ it  Otherwise tolerating it well and feeling well overall  Diet: not eating healthy especially since holiday   Caffeine: 2 mugs of coffee a day, 1 large mug unsweet ice tea 2-3 x a week, occ diet soda  Exercise: none lately the past several weeks, was walking puppy 2-3 x a week x 20 minutes  Weight: fluctuates between the 210's-230's over the last several years, currently on the higher end and up to 241 lbs after holidays   REVIEW OF SYMPTOMS   Review of Systems   Constitutional: Negative. Respiratory: Negative. Cardiovascular: Negative. Gastrointestinal: Negative for abdominal pain, blood in stool, constipation, diarrhea, melena, nausea and vomiting. Neurological: Negative. Endo/Heme/Allergies: Negative. PROBLEM LIST/MEDICAL HISTORY     Problem List  Date Reviewed: 2/8/2022          Codes Class Noted    Postmenopause ICD-10-CM: Z78.0  ICD-9-CM: V49.81  11/11/2020    Overview Addendum 11/11/2021  4:42 PM by Vivienne Govea MD     LMP 8-7862, age 55 yo, hormones tested by Bautista Smith  c/w  as well             Severe obesity (BMI 35.0-39. 9) with comorbidity ([facility] [address] ICD-10-CM: E66.01  ICD-9-CM: 278.01  5/9/2018        Prediabetes ICD-10-CM: R73.03  ICD-9-CM: 790.29  4/30/2018    Overview Addendum 4/30/2018  2:19 PM by Vivienne Govea MD                  Mixed hyperlipidemia ICD-10-CM: E78.2  ICD-9-CM: 272.2  4/30/2018        Fatty liver ICD-10-CM: K76.0  ICD-9-CM: 571.8  4/30/2018    Overview Signed 4/30/2018 2:23 PM by Cady Myers MD     Hepatology: Dr Yany Guillermo              H/O arthroscopic knee surgery ICD-10-CM: Z98.890  ICD-9-CM: V45.89  2018        Chronic cough ICD-10-CM: R05.3  ICD-9-CM: 786.2  11/3/2017    Overview Addendum 11/3/2017 12:00 PM by Cady Myers MD     Evaluation ; Dr Marta Bose & Dr Marisela Burkitt (same group), Pulmonary evaluation summer 2017: CXR and PFT's reportedly normal: dx heartburn and allergies; referred to Allergist (Dr Angelia Hong)              Vitamin D deficiency ICD-10-CM: E55.9  ICD-9-CM: 268.9  11/3/2017    Overview Signed 11/3/2017 12:18 PM by Cady Myers MD                  Plantar fasciitis, bilateral ICD-10-CM: M72.2  ICD-9-CM: 728.71  1/3/2014        Obesity ICD-10-CM: E66.9  ICD-9-CM: 278.00  Unknown        Hypercholesterolemia ICD-10-CM: E78.00  ICD-9-CM: 272.0  2011    Overview Signed 2011 10:20 AM by Cady Myers MD                  History of gestational diabetes mellitus, diet controlled ICD-10-CM: Z86.32  ICD-9-CM: V12.21  2011    Overview Addendum 2011  5:16 PM by Cady Myers MD     2003, 2007             AR (allergic rhinitis) ICD-10-CM: J30.9  ICD-9-CM: 477.9  2011    Overview Signed 11/3/2017 11:48 AM by Cady Myers MD     Blood testing in the : mold: Re-evaluation Dr Angelia Hong at [place] [address] 10-11-17: skin testing: + trees, oaks, ragweed, grass, weeds, dust mites, mold             GERD (gastroesophageal reflux disease) ICD-10-CM: K21.9  ICD-9-CM: 530.81  2011        LBP (low back pain) ICD-10-CM: M54.50  ICD-9-CM: 724.2  2011                  PAST SURGICAL HISTORY     Past Surgical History:   Procedure Laterality Date    HX ACL RECONSTRUCTION  1997    right    HX BREAST BIOPSY  2002    benign, left breast    HX  SECTION  2003, 2007    HX COLONOSCOPY  2020    Diverticulosis, Colon Polyp, Repeat 5 yrs, Dr. Ramin Ortega HX CYST REMOVAL  6/1993    fallopian tube    HX LIPOMA RESECTION  7/2005    left arm    HX TONSIL AND ADENOIDECTOMY  11yo    HX TYMPANOSTOMY      multiple in her childhood up until T&A age 10 yo    [place] BIOPSY OF BREAST, NEEDLE CORE  10/15/2015    Yolanda    MT ELECTROCARDIOGRAM REPORT  2005    normal         MEDICATIONS     Current Outpatient Medications   Medication Sig    elderberry fruit (ELDERBERRY PO) Take 2 Caplet by mouth. Includes zinc and vitamin c    ascorbic acid, vitamin C, (Vitamin C) 250 mg tablet Take 250 mg by mouth as needed.  multivit,min/folic EZAB/TYT195 (MENOPAUSE SUPPLEMENT PO) Take  by mouth daily.  metFORMIN ER (GLUCOPHAGE XR) 500 mg tablet Take 1 Tablet by mouth two (2) times daily (with meals).  triamcinolone acetonide (KENALOG) 0.1 % topical cream Apply  to affected area two (2) times daily as needed.  cholecalciferol, VITAMIN D3, (VITAMIN D3) 5,000 unit tab tablet Take  by mouth daily.  fluticasone (FLONASE) 50 mcg/actuation nasal spray 2 Sprays by Both Nostrils route daily. (Patient taking differently: 2 Sprays by Both Nostrils route daily. Since 10-11-17 per Allergist)    multivitamin with iron-mineral 0.8 mg cmpk Take  by mouth. Multivitamin-Mineral Oil pack, Lemon Peel & Wild [employer] concentrate oil     No current facility-administered medications for this visit.           ALLERGIES   No Known Allergies       SOCIAL HISTORY     Social History     Tobacco Use    Smoking status: Never Smoker    Smokeless tobacco: Never Used   Substance Use Topics    Alcohol use: Yes     Comment: Rare, socially, cocktail or wine     Social History     Social History Narrative    [place]ong[place] Descent    , 1 son, 1 daughter     for [address]: not eating healthy especially since holiday     Caffeine: 2 mugs of coffee a day, 1 large mug unsweet ice tea 2-3 x a week, occ diet soda    Exercise: none lately the past several weeks, was walking puppy 2-3 x a week x 20 minutes    Weight: fluctuates between the 210's-230's over the last several years, currently on the higher end and up to 241 lbs after holidays         Social History     Substance and Sexual Activity   Sexual Activity Yes    Partners: Male    Birth control/protection: Condom    Comment: Menopause, LMP 4-2019 age 55 yo       IMMUNIZATIONS     Immunization History   Administered Date(s) Administered    Tdap 11/10/2015         FAMILY HISTORY     Family History   Problem Relation Age of Onset    High Cholesterol Mother         on medication    High Cholesterol Father         living    Heart Disease Father [address]     Breast Cancer Maternal Grandmother 61    OSTEOARTHRITIS Maternal Grandmother     Diabetes Maternal Grandmother     Osteoporosis Maternal Grandmother     Cancer Maternal Grandmother 80        Liver    Diabetes Maternal Grandfather     Heart Disease Maternal Grandfather     High Cholesterol Maternal Grandfather     Stroke Maternal Grandfather 79    Diabetes Paternal Grandfather     Glaucoma Paternal Grandfather     Diabetes Paternal Grandmother     Obesity Brother     Allergic Rhinitis Son         skin testing, multiple allergies    Breast Cancer Maternal Aunt          VITALS     Visit Vitals  /78 (BP 1 Location: Left upper arm, BP Patient Position: Sitting, BP Cuff Size: Large adult)   Pulse 67   Temp 97.8 °F (36.6 °C) (Oral)   Resp 16   Ht 5' 3.5\" (1.613 m)   Wt 241 lb 6.4 oz (109.5 kg)   LMP 04/17/2019   SpO2 99%   BMI 42.09 kg/m²          PHYSICAL EXAMINATION   Physical Exam  Vitals reviewed. Constitutional:       Appearance: Normal appearance. Cardiovascular:      Rate and Rhythm: Normal rate and regular rhythm. Heart sounds: Normal heart sounds. Pulmonary:      Effort: Pulmonary effort is normal.      Breath sounds: Normal breath sounds. Abdominal:      Palpations: Abdomen is soft. Tenderness:  There is no abdominal tenderness. Neurological:      Mental Status: She is alert. LABORATORY DATA/ANCILLARY/IMAGING     Results for orders placed or performed in visit on 01/07/22   LIPID PANEL   Result Value Ref Range    Cholesterol, total 233 (H) 100 - 199 mg/dL    Triglyceride 217 (H) 0 - 149 mg/dL    HDL Cholesterol 55 >39 mg/dL    VLDL, calculated 39 5 - 40 mg/dL    LDL, calculated 139 (H) 0 - 99 mg/dL   HEMOGLOBIN A1C WITH EAG   Result Value Ref Range    Hemoglobin A1c 5.9 (H) 4.8 - 5.6 %    Estimated average glucose 787 mg/dL   METABOLIC PANEL, BASIC   Result Value Ref Range    Glucose 114 (H) 65 - 99 mg/dL    BUN 18 6 - 24 mg/dL    Creatinine 0.81 0.57 - 1.00 mg/dL    GFR est non-AA 86 >59 mL/min/1.73    GFR est AA 99 >59 mL/min/1.73    BUN/Creatinine ratio 22 9 - 23    Sodium 145 (H) 134 - 144 mmol/L    Potassium 4.6 3.5 - 5.2 mmol/L    Chloride 106 96 - 106 mmol/L    CO2 22 20 - 29 mmol/L    Calcium 9.9 8.7 - 10.2 mg/dL   ALT   Result Value Ref Range    ALT (SGPT) 27 0 - 32 IU/L   AST   Result Value Ref Range    AST (SGOT) 20 0 - 40 IU/L   CVD REPORT   Result Value Ref Range    INTERPRETATION Note           ASSESSMENT & PLAN   Diagnoses and all orders for this visit:    1. Prediabetes  -     metFORMIN ER (GLUCOPHAGE XR) 500 mg tablet; Take 1 Tablet by mouth two (2) times daily (with meals). 2. Mixed hyperlipidemia    3. Obesity, Class III, BMI 40-49.9 (morbid obesity) (HCC)  -     metFORMIN ER (GLUCOPHAGE XR) 500 mg tablet; Take 1 Tablet by mouth two (2) times daily (with meals). 3 month follow up medication check after starting on Metformin at her CPE  for prediabetes and weight mgt. She admits to taking it inconsistently. Initially some loose stools but that is better now.   Trying to get in the habit of being more consistent w/ it  Otherwise tolerating it well and feeling well overall  Reviewed medications and side effects   Wants to continue bid dosing of the Metformin which was renewed for her today  Fasting lab results from 2/1/22, cardiovascular risk and specific lipid/LDL/Hgba1c goals reviewed  Reviewed diet, nutrition, exercise, weight management, BMI/goals.   Will return for fasting CPE  and follow up sooner in the interim for any problems or concerns

## 2022-03-18 PROBLEM — E66.01 SEVERE OBESITY (BMI 35.0-39.9) WITH COMORBIDITY (HCC): Status: ACTIVE | Noted: 2018-05-09

## 2022-03-19 PROBLEM — K76.0 FATTY LIVER: Status: ACTIVE | Noted: 2018-04-30

## 2022-03-19 PROBLEM — Z78.0 POSTMENOPAUSE: Status: ACTIVE | Noted: 2020-11-11

## 2022-03-19 PROBLEM — E55.9 VITAMIN D DEFICIENCY: Status: ACTIVE | Noted: 2017-11-03

## 2022-03-19 PROBLEM — E78.2 MIXED HYPERLIPIDEMIA: Status: ACTIVE | Noted: 2018-04-30

## 2022-03-19 PROBLEM — Z98.890 H/O ARTHROSCOPIC KNEE SURGERY: Status: ACTIVE | Noted: 2018-02-09

## 2022-03-19 PROBLEM — R05.3 CHRONIC COUGH: Status: ACTIVE | Noted: 2017-11-03

## 2022-03-20 PROBLEM — R73.03 PREDIABETES: Status: ACTIVE | Noted: 2018-04-30

## 2022-10-11 ENCOUNTER — TELEPHONE (OUTPATIENT)
Dept: FAMILY MEDICINE CLINIC | Age: 52
End: 2022-10-11

## 2022-10-11 DIAGNOSIS — R73.03 PREDIABETES: ICD-10-CM

## 2022-10-11 DIAGNOSIS — E78.2 MIXED HYPERLIPIDEMIA: ICD-10-CM

## 2022-10-11 DIAGNOSIS — Z00.00 ROUTINE GENERAL MEDICAL EXAMINATION AT A HEALTH CARE FACILITY: Primary | ICD-10-CM

## 2022-10-11 NOTE — TELEPHONE ENCOUNTER
----- Message from Juliana Lu sent at 10/10/2022  2:49 PM EDT -----  Subject: Referral Request    Reason for referral request? PT would like orders for blood work before   her next APPT 1/5/23. Please call her to schedule. Provider patient wants to be referred to(if known): Danica Pittman    Provider Phone Number(if known):     Additional Information for Provider?   ---------------------------------------------------------------------------  --------------  [address] [facility] [employer]    [phone]; OK to leave message on voicemail  ---------------------------------------------------------------------------  --------------

## 2022-10-18 NOTE — TELEPHONE ENCOUNTER
Called pt to let her know that I would be sending her the lab orders a copy of the [facility] walk in clinics along with [facility] and she can go to whichever is better for her. I asked her to not get her labs done any sooner than a week before her appt.

## 2023-03-09 ENCOUNTER — OFFICE VISIT (OUTPATIENT)
Dept: FAMILY MEDICINE CLINIC | Age: 53
End: 2023-03-09
Payer: COMMERCIAL

## 2023-03-09 VITALS
WEIGHT: 237 LBS | OXYGEN SATURATION: 100 % | HEIGHT: 64 IN | DIASTOLIC BLOOD PRESSURE: 74 MMHG | HEART RATE: 86 BPM | RESPIRATION RATE: 16 BRPM | TEMPERATURE: 98 F | BODY MASS INDEX: 40.46 KG/M2 | SYSTOLIC BLOOD PRESSURE: 122 MMHG

## 2023-03-09 DIAGNOSIS — R73.03 PREDIABETES: ICD-10-CM

## 2023-03-09 DIAGNOSIS — Z00.00 ROUTINE GENERAL MEDICAL EXAMINATION AT A HEALTH CARE FACILITY: Primary | ICD-10-CM

## 2023-03-09 DIAGNOSIS — E78.2 MIXED HYPERLIPIDEMIA: ICD-10-CM

## 2023-03-09 PROBLEM — Z87.898 HISTORY OF VERTIGO: Status: ACTIVE | Noted: 2023-03-09

## 2023-03-09 PROCEDURE — 99396 PREV VISIT EST AGE 40-64: CPT | Performed by: FAMILY MEDICINE

## 2023-03-09 RX ORDER — MINERAL OIL
180 ENEMA (ML) RECTAL
COMMUNITY

## 2023-03-09 RX ORDER — GLUCOSAMINE/CHONDR SU A SOD 750-600 MG
1 TABLET ORAL DAILY
COMMUNITY

## 2023-03-09 NOTE — PROGRESS NOTES
Chief Complaint   Patient presents with    Complete Physical    Cholesterol Problem     Had labs done last week    Pre-diabetes     1. \"Have you been to the ER, urgent care clinic since your last visit? Hospitalized since your last visit? \" No    2. \"Have you seen or consulted any other health care providers outside of the [address] [place] [address] since your last visit? \" Eye exam [address] [facility]  Dr Theodore Horton ~Sept, derm. Dr Viviane Alexander Nov    3. For patients aged 39-70: Has the patient had a colonoscopy / FIT/ Cologuard? Yes - no Care Gap present 12/2020 Diverticulosis, Colon Polyp, Repeat 5 yrs, Dr. Fariba Turner      If the patient is female:    4. For patients aged 41-77: Has the patient had a mammogram within the past 2 years? Yes - no Care Gap present done @ GYN    5. For patients aged 21-65: Has the patient had a pap smear?  Yes - no Care Gap present Dr Aurea Sharma ~ Dec

## 2023-03-09 NOTE — PROGRESS NOTES
Chief Complaint   Patient presents with    Complete Physical     Fasting labs done 3-3-23    Cholesterol Problem    Pre-diabetes       HISTORY OF PRESENT ILLNESS   Annual CPE  Follow up hyperlipidemia, prediabetes, and medication check on Metformin  Review of fasting labs done 3-3-23  Diet: follows mostly low carb diet but still struggles w/ sugars/sweets, those are \"her weakness\"  Caffeine: 2 mugs of coffee a day, 1 large mug unsweet ice tea 2-3 x a week, occ diet soda  Exercise: walks dog 3-4 x a week x 20-30 minutes, sometimes they do a light jog  Weight: fluctuates between the 210's-230's over the last several years, currently on the higher end; was down to 211 lbs back in 2018 when she was really disciplined w/ diet/exercise, but lacks motivation to be that strict again  Overall feeling well in general   REVIEW OF SYMPTOMS   Review of Systems   Constitutional: Negative. HENT: Negative. Eyes: Negative. Respiratory: Negative. Cardiovascular: Negative. Gastrointestinal: Negative. Genitourinary: Negative. Musculoskeletal: Negative. Neurological: Negative. Endo/Heme/Allergies: Negative. Psychiatric/Behavioral: Negative.            PROBLEM LIST/MEDICAL HISTORY     Problem List  Date Reviewed: 3/9/2023            Codes Class Noted    History of vertigo ICD-10-CM: Z87.898  ICD-9-CM: V12.49  3/9/2023    Overview Signed 3/9/2023 11:13 AM by Magaly Drew MD     [facility], ENT Dr. Nicanor Silva, s/p PT             Postmenopause ICD-10-CM: Z78.0  ICD-9-CM: V49.81  11/11/2020    Overview Addendum 11/11/2021  4:42 PM by Magaly Drew MD     LMP 7-0537, age 53 yo, hormones tested by Gyn  c/w  as well             Prediabetes ICD-10-CM: R73.03  ICD-9-CM: 790.29  4/30/2018    Overview Addendum 4/30/2018  2:19 PM by Magaly Drew MD                  Mixed hyperlipidemia ICD-10-CM: E78.2  ICD-9-CM: 272.2  4/30/2018        Fatty liver ICD-10-CM: K76.0  ICD-9-CM: 571.8  2018    Overview Signed 2018  2:23 PM by Qi Beltran MD     Hepatology: Dr Immanuel Knutson              H/O arthroscopic knee surgery ICD-10-CM: Z98.890  ICD-9-CM: V45.89  2018        Chronic cough ICD-10-CM: R05.3  ICD-9-CM: 786.2  11/3/2017    Overview Addendum 11/3/2017 12:00 PM by Qi Beltran MD     Evaluation ; Dr Flor Case & Dr Hardy Tracy (same group), Pulmonary evaluation summer 2017: CXR and PFT's reportedly normal: dx heartburn and allergies; referred to Allergist (Dr Jaxson Sparks)              Vitamin D deficiency ICD-10-CM: E55.9  ICD-9-CM: 268.9  11/3/2017    Overview Signed 11/3/2017 12:18 PM by Qi Beltran MD                  Plantar fasciitis, bilateral ICD-10-CM: M72.2  ICD-9-CM: 728.71  1/3/2014        Hypercholesterolemia ICD-10-CM: E78.00  ICD-9-CM: 272.0  2011    Overview Signed 2011 10:20 AM by Qi Beltran MD                  History of gestational diabetes mellitus, diet controlled ICD-10-CM: Z86.32  ICD-9-CM: V12.21  2011    Overview Addendum 2011  5:16 PM by Qi Beltran MD     2003, 2007             AR (allergic rhinitis) ICD-10-CM: J30.9  ICD-9-CM: 477.9  2011    Overview Signed 11/3/2017 11:48 AM by Qi Beltran MD     Blood testing in the : mold: Re-evaluation Dr Jaxson Sparks at [place] [id number] 10-11-17: skin testing: + trees, oaks, ragweed, grass, weeds, dust mites, mold             GERD (gastroesophageal reflux disease) ICD-10-CM: K21.9  ICD-9-CM: 530.81  2011        LBP (low back pain) ICD-10-CM: M54.50  ICD-9-CM: 724.2  2011               PAST SURGICAL HISTORY     Past Surgical History:   Procedure Laterality Date    HX ACL RECONSTRUCTION  1997    right    HX BREAST BIOPSY  2002    benign, left breast    HX  SECTION  2003, 2007    HX COLONOSCOPY  2020    Diverticulosis, Colon Polyp, Repeat 5 yrs, Dr. Shae Dent 6/1993    fallopian tube    HX LIPOMA RESECTION  7/2005    left arm    HX TONSIL AND ADENOIDECTOMY  9yo    HX TYMPANOSTOMY      multiple in her childhood up until T&A age 12 yo    AK BX BREAST NEEDLE CORE W/O IMAGING GUIDANCE SPX  10/15/2015    [facility]    AK ECG ROUTINE ECG W/LEAST 12 LDS I&R ONLY  2005    normal         MEDICATIONS     Current Outpatient Medications   Medication Sig    Biotin 2,500 mcg cap Take 1 Capsule by mouth daily. fexofenadine (ALLEGRA) 180 mg tablet Take 180 mg by mouth daily as needed for Allergies. metFORMIN ER (GLUCOPHAGE XR) 500 mg tablet Take 1 Tablet by mouth two (2) times daily (with meals). elderberry fruit (ELDERBERRY PO) Take 2 Caplet by mouth. Includes zinc and vitamin c    ascorbic acid, vitamin C, (VITAMIN C) 250 mg tablet Take 250 mg by mouth as needed. Takes about 4 times a week    multivit,min/folic [id number] (MENOPAUSE SUPPLEMENT PO) Take  by mouth daily. triamcinolone acetonide (KENALOG) 0.1 % topical cream Apply  to affected area two (2) times daily as needed. cholecalciferol, VITAMIN D3, (VITAMIN D3) 5,000 unit tab tablet Take  by mouth daily. fluticasone (FLONASE) 50 mcg/actuation nasal spray 2 Sprays by Both Nostrils route daily. multivitamin with iron-mineral 0.8 mg cmpk Take  by mouth. Multivitamin-Mineral Oil pack, Lemon Peel & Wild [employer] concentrate oil     No current facility-administered medications for this visit.           ALLERGIES   No Known Allergies       SOCIAL HISTORY     Social History     Tobacco Use    Smoking status: Never    Smokeless tobacco: Never   Substance Use Topics    Alcohol use: Yes     Comment: Rare, socially, cocktail or wine     Social History     Social History Narrative    [place]    , 2 children    1 son 24 yo (lives at home, goes to [employer] starting Engineering Program at [employer] 2023, working)    1 daughter (sophomore at [employer])    Works full time as a  for [employer]    Enjoys sewing and embroidery        Diet: follows mostly low carb diet but still struggles w/ sugars/sweets, those are \"her weakness\"    Caffeine: 2 mugs of coffee a day, 1 large mug unsweet ice tea 2-3 x a week, occ diet soda    Exercise: walks dog 3-4 x a week x 20-30 minutes, sometimes they do a light jog    Weight: fluctuates between the 210's-230's over the last several years, currently on the higher end; was down to 211 lbs back in 2018 when she was really disciplined w/ diet/exercise, but lacks motivation to be that strict again         Social History     Substance and Sexual Activity   Sexual Activity Yes    Partners: Male    Birth control/protection: Condom    Comment: Menopause, LMP 4-2019 age 53 yo       IMMUNIZATIONS     Immunization History   Administered Date(s) Administered    Tdap 11/10/2015         FAMILY HISTORY     Family History   Problem Relation Age of Onset    High Cholesterol Mother         on medication    High Cholesterol Father         living    Heart Disease Father 71        [address]ent place     Breast Cancer Maternal Grandmother 61    OSTEOARTHRITIS Maternal Grandmother     Diabetes Maternal Grandmother     Osteoporosis Maternal Grandmother     Cancer Maternal Grandmother 80        Liver    Diabetes Maternal Grandfather     Heart Disease Maternal Grandfather     High Cholesterol Maternal Grandfather     Stroke Maternal Grandfather 79    Diabetes Paternal Grandfather     Glaucoma Paternal Grandfather     Diabetes Paternal Grandmother     Obesity Brother     Allergic Rhinitis Son         skin testing, multiple allergies    Breast Cancer Maternal Aunt          VITALS   Visit Vitals  /74 (BP 1 Location: Left upper arm, BP Patient Position: Sitting, BP Cuff Size: Large adult)   Pulse 86   Temp 98 °F (36.7 °C) (Oral)   Resp 16   Ht 5' 3.5\" (1.613 m)   Wt 237 lb (107.5 kg)   LMP 04/17/2019   SpO2 100%   BMI 41.32 kg/m²          PHYSICAL EXAMINATION   Physical Exam  Vitals reviewed. Constitutional:       General: She is not in acute distress. HENT:      Right Ear: Tympanic membrane normal.      Left Ear: Tympanic membrane normal.   Eyes:      Conjunctiva/sclera: Conjunctivae normal.   Neck:      Thyroid: No thyroid mass, thyromegaly or thyroid tenderness. Vascular: No carotid bruit. Cardiovascular:      Rate and Rhythm: Normal rate and regular rhythm. Heart sounds: Normal heart sounds. Pulmonary:      Effort: Pulmonary effort is normal.      Breath sounds: Normal breath sounds. Abdominal:      Palpations: Abdomen is soft. Tenderness: There is no abdominal tenderness. Musculoskeletal:         General: No swelling or tenderness. Normal range of motion. Cervical back: Neck supple. Right lower leg: No edema. Left lower leg: No edema. Lymphadenopathy:      Cervical: No cervical adenopathy. Skin:     General: Skin is warm and dry. Neurological:      General: No focal deficit present. Mental Status: She is alert and oriented to person, place, and time. Mental status is at baseline. Cranial Nerves: No cranial nerve deficit.    Psychiatric:         Mood and Affect: Mood normal.              LABORATORY DATA/ANCILLARY/IMAGING     Results for orders placed or performed in visit on 10/11/22   CBC W/O DIFF   Result Value Ref Range    WBC 4.8 3.4 - 10.8 x10E3/uL    RBC 4.71 3.77 - 5.28 x10E6/uL    HGB 13.6 11.1 - 15.9 g/dL    HCT 40.0 34.0 - 46.6 %    MCV 85 79 - 97 fL    MCH 28.9 26.6 - 33.0 pg    MCHC 34.0 31.5 - 35.7 g/dL    RDW 13.2 11.7 - 15.4 %    PLATELET 855 350 - 359 X65M1/MT   METABOLIC PANEL, COMPREHENSIVE   Result Value Ref Range    Glucose 102 (H) 70 - 99 mg/dL    BUN 20 6 - 24 mg/dL    Creatinine 0.80 0.57 - 1.00 mg/dL    eGFR 90 >59 mL/min/1.73    BUN/Creatinine ratio 25 (H) 9 - 23    Sodium 142 134 - 144 mmol/L    Potassium 3.9 3.5 - 5.2 mmol/L    Chloride 104 96 - 106 mmol/L    CO2 23 20 - 29 mmol/L    Calcium 10.2 8.7 - 10.2 mg/dL    Protein, total 7.4 6.0 - 8.5 g/dL    Albumin 4.9 (H) 3.8 - 4.8 g/dL    GLOBULIN, TOTAL 2.5 1.5 - 4.5 g/dL    A-G Ratio 2.0 1.2 - 2.2    Bilirubin, total 0.5 0.0 - 1.2 mg/dL    Alk. phosphatase 54 44 - 121 IU/L    AST (SGOT) 24 0 - 40 IU/L    ALT (SGPT) 30 0 - 32 IU/L   LIPID PANEL   Result Value Ref Range    Cholesterol, total 225 (H) 100 - 199 mg/dL    Triglyceride 149 0 - 149 mg/dL    HDL Cholesterol 57 >39 mg/dL    VLDL, calculated 27 5 - 40 mg/dL    LDL, calculated 141 (H) 0 - 99 mg/dL   TSH 3RD GENERATION   Result Value Ref Range    TSH 2.830 0.450 - 4.500 uIU/mL   HEMOGLOBIN A1C WITH EAG   Result Value Ref Range    Hemoglobin A1c 5.8 (H) 4.8 - 5.6 %    Estimated average glucose 120 mg/dL   CVD REPORT   Result Value Ref Range    INTERPRETATION Note              ASSESSMENT & PLAN       ICD-10-CM ICD-9-CM    1. Routine general medical examination at a health care facility  Z00.00 V70.0       2. Mixed hyperlipidemia  E78.2 272.2 REFERRAL TO DIETITIAN      3. Prediabetes  R73.03 790.29 REFERRAL TO DIETITIAN      4. BMI 40.0-44.9, adult ([facility]  Z68.41 V85.41 REFERRAL TO DIETITIAN          Review of complete fasting labs from 3-3-23 reviewed w/ pt today   Lipid/HgbA1c goals discussed at length  Reviewed diet, nutrition, exercise, weight management, BMI/goals. Referral to dietician. Continue current regimen of Metformin for now  Age/risk based screening recommendations, health maintenance & prevention counseling. Cancer screening USPTFS guidelines reviewed w/ pt today. Discussed benefits/positive/negative outcomes of screening based on age/risk stratification. Informed consent for/against screening based on pt's personal hx/risk factors. Updated in history above and health maintenance. Sees gyn annually for well woman visits/gyn exams and mammogram screens. Up to date. Reports requested. Colonoscopy up to date.    She may RTC 1 yr for next annual wellness check and complete fasting labs and/or return sooner in the interim ~ at a shorter 6 month interval to reassess lipids and HgbA1c after dietary modification, exercise and weight goals.

## 2023-03-22 ENCOUNTER — HOSPITAL ENCOUNTER (OUTPATIENT)
Dept: NUTRITION | Age: 53
Discharge: HOME | End: 2023-03-22
Payer: COMMERCIAL

## 2023-03-22 DIAGNOSIS — R73.03 PREDIABETES: ICD-10-CM

## 2023-03-22 DIAGNOSIS — E78.2 MIXED HYPERLIPIDEMIA: Primary | ICD-10-CM

## 2023-03-22 DIAGNOSIS — Z71.3 DIETARY COUNSELING AND SURVEILLANCE: ICD-10-CM

## 2023-03-22 PROCEDURE — 97802 MEDICAL NUTRITION INDIV IN: CPT | Performed by: DIETITIAN, REGISTERED

## 2023-03-22 NOTE — PROGRESS NOTES
[address] [facility]     Nutrition Assessment - Medical Nutrition Therapy   Outpatient Initial Evaluation         Patient Name: Ziggy Liang : 1970   Treatment Diagnosis: Z68.42 (ICD-10-CM) - BMI 45.0-49.9, adult (HCC)   E78.5 (ICD-10-CM) - Hyperlipidemia   R73.03 (ICD-10-CM) - Prediabetes   Z71.3 (ICD-10-CM) - Dietary counseling and surveillance      Referral Source: Cindy Maurice [facility] [place]): 3/22/2023     In time:   1100am             Out time:   1200pm   Total Treatment Time (min):   60     Gender: female Age: 50 y.o. Ht: 63.5 in Wt: 237.2 lb 107.5 kg   BMI: 41.32 (class III obesity) BMR   Male  BMR Female 1672     Past Medical History:  Patient Active Problem List   Diagnosis Code    AR (allergic rhinitis) J30.9    GERD (gastroesophageal reflux disease) K21.9    LBP (low back pain) M54.50    Hypercholesterolemia E78.00    History of gestational diabetes mellitus, diet controlled Z86.32    Plantar fasciitis, bilateral M72.2    Chronic cough R05.3    Vitamin D deficiency E55.9    H/O arthroscopic knee surgery Z98.890    Prediabetes R73.03    Mixed hyperlipidemia E78.2    Fatty liver K76.0    Postmenopause Z78.0    History of vertigo Z87.898        Pertinent Medications:     Current Outpatient Medications:     Biotin 2,500 mcg cap, Take 1 Capsule by mouth daily. , Disp: , Rfl:     fexofenadine (ALLEGRA) 180 mg tablet, Take 180 mg by mouth daily as needed for Allergies. , Disp: , Rfl:     metFORMIN ER (GLUCOPHAGE XR) 500 mg tablet, Take 1 Tablet by mouth two (2) times daily (with meals). , Disp: 180 Tablet, Rfl: 2    elderberry fruit (ELDERBERRY PO), Take 2 Caplet by mouth. Includes zinc and vitamin c, Disp: , Rfl:     ascorbic acid, vitamin C, (VITAMIN C) 250 mg tablet, Take 250 mg by mouth as needed. Takes about 4 times a week, Disp: , Rfl:     multivit,min/folic QORM/OWX895 (MENOPAUSE SUPPLEMENT PO), Take  by mouth daily. , Disp: , Rfl: triamcinolone acetonide (KENALOG) 0.1 % topical cream, Apply  to affected area two (2) times daily as needed. , Disp: , Rfl:     cholecalciferol, VITAMIN D3, (VITAMIN D3) 5,000 unit tab tablet, Take  by mouth daily. , Disp: , Rfl:     fluticasone (FLONASE) 50 mcg/actuation nasal spray, 2 Sprays by Both Nostrils route daily. , Disp: 1 Bottle, Rfl: 0    multivitamin with iron-mineral 0.8 mg cmpk, Take  by mouth. Multivitamin-Mineral Oil pack, Lemon Peel & Wild [employer] concentrate oil, Disp: , Rfl:      Biochemical Data:   Lab Results   Component Value Date/Time    Hemoglobin A1c 5.8 (H) 03/03/2023 08:08 AM     Lab Results   Component Value Date/Time    Sodium 142 03/03/2023 08:08 AM    Potassium 3.9 03/03/2023 08:08 AM    Chloride 104 03/03/2023 08:08 AM    CO2 23 03/03/2023 08:08 AM    Glucose 102 (H) 03/03/2023 08:08 AM    BUN 20 03/03/2023 08:08 AM    Creatinine 0.80 03/03/2023 08:08 AM    BUN/Creatinine ratio 25 (H) 03/03/2023 08:08 AM    GFR est AA 99 02/01/2022 08:41 AM    GFR est non-AA 86 02/01/2022 08:41 AM    Calcium 10.2 03/03/2023 08:08 AM    Bilirubin, total 0.5 03/03/2023 08:08 AM    Alk. phosphatase 54 03/03/2023 08:08 AM    Protein, total 7.4 03/03/2023 08:08 AM    Albumin 4.9 (H) 03/03/2023 08:08 AM    A-G Ratio 2.0 03/03/2023 08:08 AM    ALT (SGPT) 30 03/03/2023 08:08 AM    AST (SGOT) 24 03/03/2023 08:08 AM     Lab Results   Component Value Date/Time    Cholesterol, total 225 (H) 03/03/2023 08:08 AM    HDL Cholesterol 57 03/03/2023 08:08 AM    LDL, calculated 141 (H) 03/03/2023 08:08 AM    LDL, calculated 123 (H) 11/08/2019 08:42 AM    VLDL, calculated 27 03/03/2023 08:08 AM    VLDL, calculated 22 11/08/2019 08:42 AM    Triglyceride 149 03/03/2023 08:08 AM        Assessment:   Pt is a 49 yo female seen today for prediabetes and obesity. Pt recent lab results show A1c 5.8 (H), Glu 102 (H), Chol 225 (H), and  (H). Pt noted being overweight all of her adult life.  Pt has been on and off various diets. Pt predominantly following keto ideas and low carb lifestyle. Pt lives at home with  and 2  teenagers. Pt  is watching foods more closely and trying to follow vegan diet. Pt noted challenge with sweets and cravigns for sweets. Pt  will bring home donuts and sweets which can make for challenges. Pt mentioned feeling tired of dieting and having to think so hard about what she is eating. Pt is cooking most meals from home and when eating out is mindful of carbohydrate intake. Pt is trying to take more natural supplements to aid with health concerns. Pt noted trying to do a doTerra cleanse once per quarter. 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 Pt is not currently doing exercise and finds time to be a barrier in doing exercise. Pt does walk the dog and is wanting to do more as the warmer season approaches  . Exercise: walks dog 3-4 x a week x 20-30 minutes, sometimes they do a light jog; time constraints to exercising     Wt Readings from Last 3 Encounters:   03/09/23 107.5 kg (237 lb)   02/08/22 109.5 kg (241 lb 6.4 oz)   11/11/21 106.6 kg (235 lb)     Lab Results   Component Value Date/Time    Hemoglobin A1c 5.8 (H) 03/03/2023 08:08 AM    Hemoglobin A1c 5.9 (H) 02/01/2022 08:41 AM    Hemoglobin A1c 5.8 (H) 11/05/2021 08:35 AM            Food & Nutrition: B- Vital Proteins collagen with coffee; eggs bites  S-   L- Keto bread with ham and cheese; salad with grilled chicken from [employer];  Leftovers, Salad from home   S-   D- Double vegetable and protein, small amount of carb   S-   Drinks: 2 mugs of coffee a day (tbsp of MCT and 2 stevia, butter), 1 large mug unsweet ice tea 2-3 x a week, occ diet soda, Water,     Sweets and chocolates are the challenge    bringing home more breads and carb foods, donuts on weekend     Estimate Needs:    Equation( [x] MSJ ; []  HBE; [] Aspen Lo; [] other)  * Activity Factor (1.3) - 500   Calories:  1600 Protein: 100 Carbs: 180 Fat: 53   Kcal/day g/day  g/day  g/day        percent: 25  45  30               Nutrition Diagnosis Obesity R/T excessive energy intake AEB BMI >40   Food and nutrition related knowledge deficit R/T mixed information about various diet patterns for weight loss AEB pt questions today. Physical inactivity R/T low motivation and perceived barriers to change AEB pt report of not being able to go out for exercise due to not enough time. Nutrition Intervention &  Education: Educated pt on the pathophysiology of prediabetes, insulin resistance and the rationale for dietary modifications and increased activity. Educated pt on lean proteins, healthy fats, non-starchy vegetables, and complex carbohydrate food sources. Discussed limiting carbohydrates, label reading, meal timing, and appropriate serving sizes. Encouraged pt to avoid sugary beverages. Reviewed meal builder tool, calorie and macro breakdown as well as exercise parameters. Handouts Provided: []  Carbohydrates  []  Protein  [x]  Non-starchy Vegatbles  []  Food Label  []  Meal and Snack Ideas  []  Food Journals []  Diabetes  [x]  Cholesterol  []  Sodium  []  Gen Nutr Guidelines  []  SBGM Guidelines  [x]  Others: Meal Builder   Information Reviewed with: Pt    Readiness to Change Stage: []  Pre-contemplative    []  Contemplative  []  Preparation               [x]  Action                  []  Maintenance   Potential Barriers to Learning: []  Decline in memory    []  Language barrier   []  Other:  []  Emotional                  []  Limited mobility     [x]  None  []  Lack of motivation     [] Vision, hearing or cognitive impairment   Expected Compliance: Pt expressed comprehension, high motivation, and compliance is expected.         Nutritional Goal - To promote lifestyle changes to result in:    [x]  Weight loss  []  Improved diabetic control  [x]  Decreased cholesterol levels  []  Decreased blood pressure  []  Weight maintenance []  Preventing any interactions associated with food allergies  []  Adequate weight gain toward goal weight  []  Other:        Patient Goals:   - Schedule exercise to find best timing in the week. - Increase accountability and awareness of food choices by recording food and beverage intake by using a food journal at least 3 days per week.      Dietitian Signature: Colten Max RDN Date: 3/22/2023   Follow-up: 4 weeks  Time: 1100 AM

## 2023-04-20 ENCOUNTER — APPOINTMENT (OUTPATIENT)
Dept: NUTRITION | Age: 53
End: 2023-04-20

## 2023-11-29 ENCOUNTER — OFFICE VISIT (OUTPATIENT)
Age: 53
End: 2023-11-29
Payer: COMMERCIAL

## 2023-11-29 VITALS
HEIGHT: 64 IN | HEART RATE: 97 BPM | DIASTOLIC BLOOD PRESSURE: 77 MMHG | BODY MASS INDEX: 41.93 KG/M2 | OXYGEN SATURATION: 96 % | TEMPERATURE: 98 F | RESPIRATION RATE: 14 BRPM | WEIGHT: 245.6 LBS | SYSTOLIC BLOOD PRESSURE: 118 MMHG

## 2023-11-29 DIAGNOSIS — J01.90 ACUTE BACTERIAL SINUSITIS: Primary | ICD-10-CM

## 2023-11-29 DIAGNOSIS — R73.03 PREDIABETES: ICD-10-CM

## 2023-11-29 DIAGNOSIS — B96.89 ACUTE BACTERIAL SINUSITIS: Primary | ICD-10-CM

## 2023-11-29 PROCEDURE — 99214 OFFICE O/P EST MOD 30 MIN: CPT | Performed by: STUDENT IN AN ORGANIZED HEALTH CARE EDUCATION/TRAINING PROGRAM

## 2023-11-29 RX ORDER — DEXTROMETHORPHAN HYDROBROMIDE AND PROMETHAZINE HYDROCHLORIDE 15; 6.25 MG/5ML; MG/5ML
SYRUP ORAL
COMMUNITY
Start: 2023-10-22

## 2023-11-29 RX ORDER — BENZONATATE 200 MG/1
CAPSULE ORAL
COMMUNITY
Start: 2023-10-22

## 2023-11-29 RX ORDER — AMOXICILLIN AND CLAVULANATE POTASSIUM 875; 125 MG/1; MG/1
1 TABLET, FILM COATED ORAL 2 TIMES DAILY
Qty: 14 TABLET | Refills: 0 | Status: SHIPPED | OUTPATIENT
Start: 2023-11-29 | End: 2023-12-06

## 2023-11-29 SDOH — ECONOMIC STABILITY: INCOME INSECURITY: HOW HARD IS IT FOR YOU TO PAY FOR THE VERY BASICS LIKE FOOD, HOUSING, MEDICAL CARE, AND HEATING?: NOT HARD AT ALL

## 2023-11-29 SDOH — ECONOMIC STABILITY: HOUSING INSECURITY
IN THE LAST 12 MONTHS, WAS THERE A TIME WHEN YOU DID NOT HAVE A STEADY PLACE TO SLEEP OR SLEPT IN A SHELTER (INCLUDING NOW)?: NO

## 2023-11-29 SDOH — ECONOMIC STABILITY: FOOD INSECURITY: WITHIN THE PAST 12 MONTHS, YOU WORRIED THAT YOUR FOOD WOULD RUN OUT BEFORE YOU GOT MONEY TO BUY MORE.: NEVER TRUE

## 2023-11-29 SDOH — ECONOMIC STABILITY: FOOD INSECURITY: WITHIN THE PAST 12 MONTHS, THE FOOD YOU BOUGHT JUST DIDN'T LAST AND YOU DIDN'T HAVE MONEY TO GET MORE.: NEVER TRUE

## 2023-12-29 ENCOUNTER — PATIENT MESSAGE (OUTPATIENT)
Age: 53
End: 2023-12-29

## 2023-12-29 DIAGNOSIS — R05.3 CHRONIC COUGH: Primary | ICD-10-CM

## 2024-01-02 RX ORDER — BENZONATATE 200 MG/1
200 CAPSULE ORAL 3 TIMES DAILY PRN
Qty: 30 CAPSULE | Refills: 0 | Status: SHIPPED | OUTPATIENT
Start: 2024-01-02 | End: 2024-01-09

## 2024-01-17 ENCOUNTER — TELEPHONE (OUTPATIENT)
Age: 54
End: 2024-01-17

## 2024-01-17 NOTE — TELEPHONE ENCOUNTER
Pt called says per her last OV 12/20/20223 with Dr. Molina provider suggested she be seen by a pulmonologist. Pt contacted Dr. Kanchan Trammell at [facility], and office is requesting recent OV notes and labs to be faxed to [phone] Phone:[phone]. Best call back number [phone]

## 2024-03-05 ENCOUNTER — HOSPITAL ENCOUNTER (OUTPATIENT)
Age: 54
Discharge: HOME | End: 2024-03-08
Payer: COMMERCIAL

## 2024-03-05 DIAGNOSIS — R05.3 CHRONIC COUGH: ICD-10-CM

## 2024-03-05 PROCEDURE — 71046 X-RAY EXAM CHEST 2 VIEWS: CPT
